# Patient Record
Sex: MALE | HISPANIC OR LATINO | URBAN - METROPOLITAN AREA
[De-identification: names, ages, dates, MRNs, and addresses within clinical notes are randomized per-mention and may not be internally consistent; named-entity substitution may affect disease eponyms.]

---

## 2023-03-17 ENCOUNTER — OFFICE VISIT (OUTPATIENT)
Dept: FAMILY MEDICINE | Facility: CLINIC | Age: 58
End: 2023-03-17
Payer: COMMERCIAL

## 2023-03-17 VITALS
TEMPERATURE: 97 F | OXYGEN SATURATION: 97 % | SYSTOLIC BLOOD PRESSURE: 120 MMHG | WEIGHT: 179 LBS | RESPIRATION RATE: 16 BRPM | HEIGHT: 67 IN | BODY MASS INDEX: 28.09 KG/M2 | HEART RATE: 70 BPM | DIASTOLIC BLOOD PRESSURE: 71 MMHG

## 2023-03-17 DIAGNOSIS — E11.9 TYPE 2 DIABETES MELLITUS WITHOUT COMPLICATION, WITH LONG-TERM CURRENT USE OF INSULIN (H): ICD-10-CM

## 2023-03-17 DIAGNOSIS — N52.9 ERECTILE DYSFUNCTION, UNSPECIFIED ERECTILE DYSFUNCTION TYPE: ICD-10-CM

## 2023-03-17 DIAGNOSIS — Z79.4 TYPE 2 DIABETES MELLITUS WITHOUT COMPLICATION, WITH LONG-TERM CURRENT USE OF INSULIN (H): ICD-10-CM

## 2023-03-17 DIAGNOSIS — E78.00 PURE HYPERCHOLESTEROLEMIA: ICD-10-CM

## 2023-03-17 DIAGNOSIS — Z00.00 ROUTINE HISTORY AND PHYSICAL EXAMINATION OF ADULT: Primary | ICD-10-CM

## 2023-03-17 DIAGNOSIS — Z12.11 SCREENING FOR COLON CANCER: ICD-10-CM

## 2023-03-17 PROBLEM — M72.2 PLANTAR FASCIITIS: Status: ACTIVE | Noted: 2021-07-27

## 2023-03-17 LAB
ANION GAP SERPL CALCULATED.3IONS-SCNC: 4 MMOL/L (ref 3–14)
BUN SERPL-MCNC: 20 MG/DL (ref 7–30)
CALCIUM SERPL-MCNC: 9.1 MG/DL (ref 8.5–10.1)
CHLORIDE BLD-SCNC: 103 MMOL/L (ref 94–109)
CHOLEST SERPL-MCNC: 108 MG/DL
CO2 SERPL-SCNC: 29 MMOL/L (ref 20–32)
CREAT SERPL-MCNC: 0.98 MG/DL (ref 0.66–1.25)
FASTING STATUS PATIENT QL REPORTED: YES
GFR SERPL CREATININE-BSD FRML MDRD: 89 ML/MIN/1.73M2
GLUCOSE BLD-MCNC: 134 MG/DL (ref 70–99)
HBA1C MFR BLD: 7.6 % (ref 0–5.6)
HDLC SERPL-MCNC: 37 MG/DL
LDLC SERPL CALC-MCNC: 54 MG/DL
NONHDLC SERPL-MCNC: 71 MG/DL
POTASSIUM BLD-SCNC: 4.2 MMOL/L (ref 3.4–5.3)
SODIUM SERPL-SCNC: 136 MMOL/L (ref 133–144)
TRIGL SERPL-MCNC: 85 MG/DL

## 2023-03-17 PROCEDURE — 99214 OFFICE O/P EST MOD 30 MIN: CPT | Mod: 25 | Performed by: PHYSICIAN ASSISTANT

## 2023-03-17 PROCEDURE — 99207 PR FOOT EXAM NO CHARGE: CPT | Mod: 25 | Performed by: PHYSICIAN ASSISTANT

## 2023-03-17 PROCEDURE — 36415 COLL VENOUS BLD VENIPUNCTURE: CPT | Performed by: PHYSICIAN ASSISTANT

## 2023-03-17 PROCEDURE — 99386 PREV VISIT NEW AGE 40-64: CPT | Performed by: PHYSICIAN ASSISTANT

## 2023-03-17 PROCEDURE — 80048 BASIC METABOLIC PNL TOTAL CA: CPT | Performed by: PHYSICIAN ASSISTANT

## 2023-03-17 PROCEDURE — 83036 HEMOGLOBIN GLYCOSYLATED A1C: CPT | Performed by: PHYSICIAN ASSISTANT

## 2023-03-17 PROCEDURE — 80061 LIPID PANEL: CPT | Performed by: PHYSICIAN ASSISTANT

## 2023-03-17 RX ORDER — INSULIN GLARGINE 100 [IU]/ML
INJECTION, SOLUTION SUBCUTANEOUS
COMMUNITY
Start: 2023-03-10 | End: 2023-03-30

## 2023-03-17 RX ORDER — CALCIUM CARBONATE 500 MG/1
1 TABLET, CHEWABLE ORAL 2 TIMES DAILY
COMMUNITY
End: 2023-03-30

## 2023-03-17 RX ORDER — ROSUVASTATIN CALCIUM 10 MG/1
TABLET, COATED ORAL
COMMUNITY
Start: 2023-03-10 | End: 2023-04-17

## 2023-03-17 RX ORDER — SILDENAFIL CITRATE 20 MG/1
20-60 TABLET ORAL
COMMUNITY
Start: 2022-03-03

## 2023-03-17 RX ORDER — BENZALKONIUM CHLORIDE 1.3 MG/ML
CLOTH TOPICAL
COMMUNITY

## 2023-03-17 RX ORDER — VIT C/B6/B5/MAGNESIUM/HERB 173 50-5-6-5MG
CAPSULE ORAL
COMMUNITY

## 2023-03-17 RX ORDER — GLIMEPIRIDE 4 MG/1
8 TABLET ORAL
COMMUNITY
Start: 2022-06-03 | End: 2023-04-17

## 2023-03-17 ASSESSMENT — PAIN SCALES - GENERAL: PAINLEVEL: NO PAIN (0)

## 2023-03-17 NOTE — PROGRESS NOTES
"  Assessment & Plan     Routine history and physical examination of adult  Completed    Type 2 diabetes mellitus without complication, with long-term current use of insulin (H)  Uncontrolled  - Lipid panel reflex to direct LDL Non-fasting; Future  - Basic metabolic panel  (Ca, Cl, CO2, Creat, Gluc, K, Na, BUN); Future  - Hemoglobin A1c; Future  - AMB Adult Diabetes Educator Referral; Future  - metFORMIN (GLUCOPHAGE) 1000 MG tablet; Take 1 tablet (1,000 mg) by mouth 2 times daily (with meals)  - Lipid panel reflex to direct LDL Non-fasting  - Basic metabolic panel  (Ca, Cl, CO2, Creat, Gluc, K, Na, BUN)  - Hemoglobin A1c  Getting labs redrawn. Discussed referral for education.  ? Numbers throughout the day and unclear of the numbers during day/after meals as does not check regularly. Cannot accurately dose out insulin until then.  Metformin refill  Screening for colon cancer  Ordered  - Colonoscopy Screening  Referral; Future    Pure hypercholesterolemia  Stable  Continue with medications    Erectile dysfunction, unspecified erectile dysfunction type  Stable  Continue medications as needed            BMI:   Estimated body mass index is 28.04 kg/m  as calculated from the following:    Height as of this encounter: 1.702 m (5' 7\").    Weight as of this encounter: 81.2 kg (179 lb).   Weight management plan: Discussed healthy diet and exercise guidelines        Return in about 3 months (around 6/17/2023) for Follow up, with me.    LONG MELCHOR PA-C  [facility]   Alejandro is a 57 year old, presenting for the following health issues:  Establish Care        History of Present Illness       Diabetes:   He presents for follow up of diabetes.  He is checking home blood glucose two times daily. He checks blood glucose before meals and at bedtime.  Blood glucose is never over 200 and never under 70. He is aware of hypoglycemia symptoms including shakiness. He has no concerns regarding " "his diabetes at this time.  He is not experiencing numbness or burning in feet, excessive thirst, blurry vision, weight changes or redness, sores or blisters on feet.         He eats 2-3 servings of fruits and vegetables daily.He consumes 0 sweetened beverage(s) daily.He exercises with enough effort to increase his heart rate 9 or less minutes per day.  He exercises with enough effort to increase his heart rate 3 or less days per week.   He is taking medications regularly.             Review of Systems   Constitutional, HEENT, cardiovascular, pulmonary, gi and gu systems are negative, except as otherwise noted.      Objective    /71   Pulse 70   Temp 97  F (36.1  C) (Tympanic)   Resp 16   Ht 1.702 m (5' 7\")   Wt 81.2 kg (179 lb)   SpO2 97%   BMI 28.04 kg/m    Body mass index is 28.04 kg/m .  Physical Exam   GENERAL: healthy, alert and no distress  HENT: ear canals and TM's normal, nose and mouth without ulcers or lesions  NECK: no adenopathy, no asymmetry, masses, or scars and thyroid normal to palpation  RESP: lungs clear to auscultation - no rales, rhonchi or wheezes  CV: regular rate and rhythm, normal S1 S2, no S3 or S4, no murmur, click or rub, no peripheral edema and peripheral pulses strong  ABDOMEN: soft, nontender, no hepatosplenomegaly, no masses and bowel sounds normal  MS: no gross musculoskeletal defects noted, no edema                    "

## 2023-03-20 ENCOUNTER — TELEPHONE (OUTPATIENT)
Dept: FAMILY MEDICINE | Facility: CLINIC | Age: 58
End: 2023-03-20
Payer: COMMERCIAL

## 2023-03-20 NOTE — TELEPHONE ENCOUNTER
Diabetes Education Scheduling Outreach #1:    Call to patient to schedule. Left message with phone number to call to schedule.    Plan for 2nd outreach attempt within 2 business days.    [facility]  Diabetes and Nutrition Scheduling

## 2023-03-22 NOTE — TELEPHONE ENCOUNTER
Diabetes Education Scheduling Outreach #2:    Call to patient to schedule. Left message with phone number to call to schedule.    Mckenzie Worthington  [place] [facility]  Diabetes and Nutrition Scheduling

## 2023-03-24 ENCOUNTER — TELEPHONE (OUTPATIENT)
Dept: GASTROENTEROLOGY | Facility: CLINIC | Age: 58
End: 2023-03-24

## 2023-03-24 ENCOUNTER — HOSPITAL ENCOUNTER (OUTPATIENT)
Facility: CLINIC | Age: 58
End: 2023-03-24
Attending: INTERNAL MEDICINE | Admitting: INTERNAL MEDICINE
Payer: COMMERCIAL

## 2023-03-24 ENCOUNTER — TELEPHONE (OUTPATIENT)
Dept: GASTROENTEROLOGY | Facility: CLINIC | Age: 58
End: 2023-03-24
Payer: COMMERCIAL

## 2023-03-24 DIAGNOSIS — Z12.11 ENCOUNTER FOR SCREENING COLONOSCOPY: Primary | ICD-10-CM

## 2023-03-24 RX ORDER — BISACODYL 5 MG/1
TABLET, DELAYED RELEASE ORAL
Qty: 4 TABLET | Refills: 0 | Status: ON HOLD | OUTPATIENT
Start: 2023-03-24 | End: 2024-01-15

## 2023-03-24 NOTE — TELEPHONE ENCOUNTER
Screening Questions  BLUE  KIND OF PREP RED  LOCATION [review exclusion criteria] GREEN  SEDATION TYPE        Y Are you active on mychart?       [facility] Ordering/Referring Provider?        [facility] What type of coverage do you have?      N Have you had a positive covid test in the last 14 days?     28.2 1. BMI  [BMI 40+ - review exclusion criteria]    Y  2. Are you able to give consent for your medical care? [IF NO,RN REVIEW]          N  3. Are you taking any prescription pain medications on a routine schedule   (ex narcotics: oxycodone, roxicodone, oxycontin,  and percocet)? [RN Review]          3a. EXTENDED PREP What kind of prescription?     N 4. Do you have any chemical dependencies such as alcohol, street drugs, or methadone?        **If yes 3- 5 , please schedule with MAC sedation.**          IF YES TO ANY 6 - 10 - HOSPITAL SETTING ONLY.     N 6.   Do you need assistance transferring?     N 7.   Have you had a heart or lung transplant?    N 8.   Are you currently on dialysis?   N 9.   Do you use daily home oxygen?   N 10. Do you take nitroglycerin?   10a.  If yes, how often?     11. [FEMALES]   Are you currently pregnant?    11a.  If yes, how many weeks? [ Greater than 12 weeks, OR NEEDED]    N 12. Do you have Pulmonary Hypertension? *NEED PAC APPT AT [facility] w/ MAC*     N 13. [review exclusion criteria]  Do you have any implantable devices in your body (pacemaker, defib, LVAD)?    N 14. In the past 6 months, have you had any heart related issues including cardiomyopathy or heart attack?     14a.  If yes, did it require cardiac stenting if so when?     N 15. Have you had a stroke or Transient ischemic attack (TIA - aka  mini stroke ) within 6 months?      Y - CPAP 16. Do you have mod to severe Obstructive Sleep Apnea?  [Hospital only]    N 17. Do you have SEVERE AND UNCONTROLLED asthma? *NEED PAC APPT AT [facility] w/MAC*     18. Are you currently taking any blood thinners?     18a. No. Continue to 19.   18b. Yes/no Blood  "Thinner: No [CONTINUE TO #19]    N 19. Do you take the medication Phentermine?    19a. If yes, \"Hold for 7 days before procedure.  Please consult your prescribing provider if you have questions about holding this medication.\"     N  20. Do you have chronic kidney disease?      Y  21. Do you have a diagnosis of diabetes?     N  22. On a regular basis do you go 3-5 days between bowel movements?      23. Preferred LOCAL Pharmacy for Pre Prescription    [ LIST ONLY ONE PHARMACY]     [place], [place] - [address], [address]    - CLOSING REMINDERS -    Informed patient they will need an adult    Cannot take any type of public or medical transportation alone    Conscious Sedation- Needs  for 6 hours after the procedure       MAC/General-Needs  for 24 hours after procedure    Pre-Procedure Covid test to be completed [[facility] PCR Testing Required]    Confirmed Nurse will call to complete assessment       - SCHEDULING DETAILS -  YES Hospital Setting Required? If yes, what is the exclusion?: KAY   EKNNETH  Surgeon    4/10  Date of Procedure  Lower Endoscopy [Colonoscopy]  Type of Procedure Scheduled  [facility]-If you answer yes to questions #8, #20, #21Which Colonoscopy Prep was Sent?     CS Sedation Type     N PAC / Pre-op Required                 "

## 2023-03-24 NOTE — TELEPHONE ENCOUNTER
Attempted to contact patient regarding upcoming Colonoscopy  procedure on 4/10/23 for pre assessment questions. No answer.     Left message to return call to [phone] #4    Discuss Covid policy and designated  policy.    Pre op exam? N/A    Arrival time: 0700. Procedure time: 0800    Facility location: [facility]; [address], [address], [place] [address] - severe KAY    Sedation type: Conscious sedation     Anticoagulants: No    Electronic implanted devices? No    Diabetic? Yes - Patient to hold oral diabetic medications day of procedure    Indication for procedure: screening colonoscopy    Bowel prep recommendation: Standard Golytely  d/t DM    Prep instructions sent via [employer]. Bowel prep script sent to    [facility] [address], [address]      Tahira Rose RN  Endoscopy Procedure Pre Assessment RN

## 2023-03-30 ENCOUNTER — NURSE TRIAGE (OUTPATIENT)
Dept: NURSING | Facility: CLINIC | Age: 58
End: 2023-03-30
Payer: COMMERCIAL

## 2023-03-30 DIAGNOSIS — U07.1 INFECTION DUE TO 2019 NOVEL CORONAVIRUS: Primary | ICD-10-CM

## 2023-03-30 NOTE — TELEPHONE ENCOUNTER
Alejandro calls and says that he tested Positive for Covid yesterday bella, at 2130. Pt. Says that he started feeling sick on Tuesday evening-with fever and chills. Pt. Says that his current symptoms are a headache, runny nose, sore throat, congestion, and a fever = 99-oral. Pt. Says that he would like to take Paxlovid. Declined paging the Dr.   COVID Positive/Requesting COVID treatment    Patient is positive for COVID and requesting treatment options.    Date of positive COVID test (PCR or at home)? Covid Positive on 3/29/2023 at 2130 with a home covid test  Current COVID symptoms: fever or chills, headache, sore throat and congestion or runny nose  Date COVID symptoms began: 3/28/2023    Message should be routed to clinic RN pool. Best phone number to use for call back: [phone]    Reason for Disposition    MILD difficulty breathing (e.g., minimal/no SOB at rest, SOB with walking, pulse <100)    Additional Information    Negative: SEVERE difficulty breathing (e.g., struggling for each breath, speaks in single words)    Negative: Difficult to awaken or acting confused (e.g., disoriented, slurred speech)    Negative: Bluish (or gray) lips or face now    Negative: Shock suspected (e.g., cold/pale/clammy skin, too weak to stand, low BP, rapid pulse)    Negative: Sounds like a life-threatening emergency to the triager    Negative: [1] Diagnosed or suspected COVID-19 AND [2] symptoms lasting 3 or more weeks    Negative: [1] COVID-19 exposure AND [2] no symptoms    Negative: COVID-19 vaccine reaction suspected (e.g., fever, headache, muscle aches) occurring 1 to 3 days after getting vaccine    Negative: COVID-19 vaccine, questions about    Negative: [1] Lives with someone known to have influenza (flu test positive) AND [2] flu-like symptoms (e.g., cough, runny nose, sore throat, SOB; with or without fever)    Negative: [1] Adult with possible COVID-19 symptoms AND [2] triager concerned about severity of symptoms or other  causes    Negative: COVID-19 and breastfeeding, questions about    Negative: SEVERE or constant chest pain or pressure  (Exception: Mild central chest pain, present only when coughing.)    Negative: MODERATE difficulty breathing (e.g., speaks in phrases, SOB even at rest, pulse 100-120)    Negative: Headache and stiff neck (can't touch chin to chest)    Negative: Oxygen level (e.g., pulse oximetry) 90 percent or lower    Negative: Chest pain or pressure  (Exception: MILD central chest pain, present only when coughing)    Negative: Patient sounds very sick or weak to the triager    Protocols used: CORONAVIRUS (COVID-19) DIAGNOSED OR [id number]

## 2023-03-30 NOTE — TELEPHONE ENCOUNTER
RN COVID TREATMENT VISIT  03/30/23    The patient has been triaged and does not require a higher level of care.    Alejandro Flores  57 year old  Current weight? 179#    Has the patient been seen by a primary care provider at an [facility] or [facility] within the past two years? Yes.   Have you been in close proximity to/do you have a known exposure to a person with a confirmed case of influenza? No.     General treatment eligibility:  Date of positive COVID test (PCR or at home)?  3/29/23, home test    Are you or have you been hospitalized for this COVID-19 infection? No.   Have you received monoclonal antibodies or antiviral treatment for COVID-19 since this positive test? No.   Do you have any of the following conditions that place you at risk of being very sick from COVID-19?   - Age 50 years or older  - Diabetes mellitus, type 1 and type 2  - Overweight or Obesity (BMI >85th percentile or BMI 25 or higher)  - A member of the [facility] community ( or [place] Native, , Black or ,  or )  Yes, patient has at least one high risk condition as noted above.     Current COVID symptoms:   - fever or chills  - cough  - fatigue  - muscle or body aches  - headache  - sore throat  - congestion or runny nose  Yes. Patient has at least one symptom as selected.     How many days since symptoms started? 5 days or less. Established patient, 12 years or older weighing at least 88.2 lbs, who has symptoms that started in the past 5 days, has not been hospitalized nor received treatment already, and is at risk for being very sick from COVID-19.     Treatment eligibility by RN:    Are you currently pregnant or nursing? No    Do you have a clinically significant hypersensitivity to nirmatrelvir or ritonavir, or toxic epidermal necrolysis (TEN) or Manley-Guy Syndrome? No    Do you have a history of hepatitis, any hepatic impairment on the Problem List  (such as Child-Leyva Class C, cirrhosis, fatty liver disease, alcoholic liver disease), or was the last liver lab (hepatic panel, ALT, AST, ALK Phos, bilirubin) elevated in the past 6 months? No    Do you have any history of severe renal impairment (eGFR < 30mL/min)? No    Is patient eligible to continue?   Yes, patient meets all eligibility requirements for the RN COVID treatment (as denoted by all no responses above).     Current Outpatient Medications   Medication Sig Dispense Refill     aspirin 81 MG tablet Take 1 tablet by mouth daily.       B Complex-C (VITAMIN B COMPLEX W/VITAMIN C) TABS tablet Take 1 tablet by mouth daily       bisacodyl (DULCOLAX) 5 MG EC tablet Take 2 tablets at 3 pm the day before your procedure. If your procedure is before 11 am, take 2 additional tablets at 11 pm. If your procedure is after 11 am, take 2 additional tablets at 6 am. For additional instructions refer to your colonoscopy prep instructions. 4 tablet 0     calcium carbonate (TUMS) 500 MG chewable tablet Take 1 chew tab by mouth 2 times daily       glimepiride (AMARYL) 4 MG tablet Take 8 mg by mouth       insulin glargine (LANTUS PEN) 100 UNIT/ML pen Inject 28 Units Subcutaneous       LANTUS SOLOSTAR 100 UNIT/ML soln        metFORMIN (GLUCOPHAGE) 1000 MG tablet Take 1 tablet (1,000 mg) by mouth 2 times daily (with meals) 180 tablet 0     Multiple Vitamins-Minerals (MULTI ADULT GUMMIES PO)        polyethylene glycol (GOLYTELY) 236 g suspension The night before the exam at 6 pm drink an 8-ounce glass every 15 minutes until the jug is half empty. If you arrive before 11 AM: Drink the other half of the Golytely jug at 11 PM night before procedure. If you arrive after 11 AM: Drink the other half of the Golytely jug at 6 AM day of procedure. For additional instructions refer to your colonoscopy prep instructions. 4000 mL 0     Respiratory Therapy Supplies (CARETOUCH CPAP & BIPAP HOSE) MISC        rosuvastatin (CRESTOR) 10 MG tablet         sildenafil (REVATIO) 20 MG tablet Take 20-60 mg by mouth       Turmeric 500 MG CAPS        Vitamin D3 (CHOLECALCIFEROL) 125 MCG (5000 UT) tablet Take by mouth daily         Medications from List 1 of the standing order (on medications that exclude the use of Paxlovid) that patient is taking: NONE. Is patient taking [place]'s Wort? No  Is patient taking Robel's Wort or any meds from List 1? No.   Medications from List 2 of the standing order (on meds that provider needs to adjust) that patient is taking: NONE. Is patient on any of the meds from List 2? No.   Medications from List 3 of standing order (on meds that a RN needs to adjust) that patient is taking: rosuvastatin (Crestor): Instructed patient to stop rosuvastatin while taking Paxlovid and restart rosuvastatin 1 day after the completion of Paxlovid.   sildenafil (Viagra, Revatio): Is patient using for erectile dysfunction? Yes, instructed patient to stop taking sildenafil while taking Paxlovid and restart sildenafil 3 days after the completion of Paxlovid. Is patient on any meds from List 3? Yes. Patient is on meds from list 3. No meds require a provider visit and at least one med required RN to adjust.     Paxlovid has an approximate 90% reduction in hospitalization. Paxlovid can possibly cause altered sense of taste, diarrhea (loose, watery stools), high blood pressure, muscle aches.     Would patient like a Paxlovid prescription?   Yes.   Lab Results   Component Value Date    GFRESTIMATED 89 03/17/2023       Was last eGFR reduced? No, eGFR 60 or greater/ No Result on record. Patient can receive the normal renal function dose. Paxlovid Rx sent to [facility]   [facility] [phone]  [address], [address]  [place] [address]    Hours:  Mon-Fri: 7:00a - 7:00p    Drive-thru services available.    Temporary change to home medications: yes, instructions given    All medication adjustments (holds, etc) were discussed with the  patient and patient was asked to repeat back (teachback) their med adjustment.  Did patient understand med adjustment? Yes, patient repeated back and understood correctly.        Reviewed the following instructions with the patient:    Paxlovid (nimatrelvir and ritonavir)    How it works  Two medicines (nirmatrelvir and ritonavir) are taken together. They stop the virus from growing. Less amount of virus is easier for your body to fight.    How to take    Medicine comes in a daily container with both medicine tablets. Take by mouth twice daily (once in the morning, once at night) for 5 days.    The number of tablets to take varies by patient.    Don't chew or break capsules. Swallow whole.    When to take  Take as soon as possible after positive COVID-19 test result, and within 5 days of your first symptoms.    Possible side effects  Can cause altered sense of taste, diarrhea (loose, watery stools), high blood pressure, muscle aches.      Bernarda Williamson RN  Clinical Triage/Primary Care  [facility]

## 2023-04-03 ENCOUNTER — TELEPHONE (OUTPATIENT)
Dept: GASTROENTEROLOGY | Facility: CLINIC | Age: 58
End: 2023-04-03
Payer: COMMERCIAL

## 2023-04-03 NOTE — TELEPHONE ENCOUNTER
Patient left voicemail asking for thyroid test to be ordered? Second attempt for pre-assessment prior to upcoming colonoscopy on 4.10.23      No answer.  Left message to return call [phone] #4    MyChart message sent    Magnolia Begum RN  Endoscopy Procedure Pre Assessment RN

## 2023-04-03 NOTE — TELEPHONE ENCOUNTER
Caller: Alejandro  Reason for Reschedule/Cancellation (please be detailed, any staff messages or encounters to note?): Covid positive 3/30/23      Prior to reschedule please review:    Ordering Provider:Chelsey    Sedation per order:CS    Does patient have any ASC Exclusions, please identify?: y--KAY      Notes on Cancelled Procedure:    Procedure:Lower Endoscopy [Colonoscopy]     Date: 4/10    Location:[facility]; [address] [place] [address]    Surgeon: Patricia        Rescheduled: Yes    Procedure: Lower Endoscopy [Colonoscopy]     Date: 5/11    Location:[facility]; [address] [place] [address]    Surgeon: EDISON Jarvis    Sedation Level Scheduled  CS,  Reason for Sedation Level order    Prep/Instructions updated and sent:

## 2023-04-14 DIAGNOSIS — Z12.11 SCREENING FOR COLON CANCER: Primary | ICD-10-CM

## 2023-04-17 ENCOUNTER — MYC REFILL (OUTPATIENT)
Dept: FAMILY MEDICINE | Facility: CLINIC | Age: 58
End: 2023-04-17
Payer: COMMERCIAL

## 2023-04-17 DIAGNOSIS — Z79.4 TYPE 2 DIABETES MELLITUS WITHOUT COMPLICATION, WITH LONG-TERM CURRENT USE OF INSULIN (H): Primary | ICD-10-CM

## 2023-04-17 DIAGNOSIS — E11.9 TYPE 2 DIABETES MELLITUS WITHOUT COMPLICATION, WITH LONG-TERM CURRENT USE OF INSULIN (H): Primary | ICD-10-CM

## 2023-04-17 DIAGNOSIS — E78.00 PURE HYPERCHOLESTEROLEMIA: ICD-10-CM

## 2023-04-17 RX ORDER — ROSUVASTATIN CALCIUM 10 MG/1
10 TABLET, COATED ORAL DAILY
Qty: 90 TABLET | Refills: 0 | Status: SHIPPED | OUTPATIENT
Start: 2023-04-17 | End: 2023-05-09

## 2023-04-17 RX ORDER — GLIMEPIRIDE 4 MG/1
8 TABLET ORAL
Qty: 180 TABLET | Refills: 0 | Status: SHIPPED | OUTPATIENT
Start: 2023-04-17 | End: 2023-08-03

## 2023-04-25 ENCOUNTER — TELEPHONE (OUTPATIENT)
Dept: GASTROENTEROLOGY | Facility: CLINIC | Age: 58
End: 2023-04-25
Payer: COMMERCIAL

## 2023-04-25 ENCOUNTER — HOSPITAL ENCOUNTER (OUTPATIENT)
Facility: AMBULATORY SURGERY CENTER | Age: 58
End: 2023-04-25
Attending: SPECIALIST | Admitting: SPECIALIST
Payer: COMMERCIAL

## 2023-04-25 NOTE — TELEPHONE ENCOUNTER
"Caller:   Reason for Reschedule/Cancellation (please be detailed, any staff messages or encounters to note?): 1ST STAFF MESSAGE   Brianna Huerta RN  P Endoscopy Scheduling Pool  Scheduling,     Please let nursing know when this patient is r/s to  ASC w/ MAC.     Thank you,   Shad      ----- Message -----   From: Ilir Patel MD   Sent: 4/24/2023   4:46 PM CDT   To: Carmen Rahman RN; Loc White; *   Subject: RE: SCHEDULED HOSPITAL - SHOULD BE ASC           Approved for MAC at [facility], cannot be done under CS.     Ulises Patel   ----- Message -----   From: Brianna Huerta RN   Sent: 4/24/2023  12:34 PM CDT   To: Carmen Rahman RN; Loc White;  Anes Review; *   Subject: RE: SCHEDULED HOSPITAL - SHOULD BE ASC           Please review and advise to determine if patient can proceed with scheduled procedure or if a hospital setting is recommended.     Patient has an order to schedule [place] [facility]   Indication: screening colonoscopy     Reason for review: severe KAY.     Per 4.14.2023 Psychiatrict encounter \"I got a message saying that the colonoscopy is NOT covered at an outpatient hospital as it is currently scheduled for you. I resent the request and chose [facility] as they said they would cover an ambulatory surgery center instead of a hospital.\"     Can patient be scheduled at [facility]?       Thank you,   Brianna Huerta RN   Endoscopy Procedure Pre Assessment RN   [phone] option 4     ----- Message -----   From: Loc White   Sent: 4/24/2023  11:13 AM CDT   To: Carmen Rahman RN; *   Subject: RE: SCHEDULED HOSPITAL - SHOULD BE ASC           Hel,       Patient is scheduled at [facility] due to KAY. I am FWD to our RN to review if we can get approval from anesthesiologist to schedule patient at [facility].       Endoscopy RN please see if this patient can be seen at [facility].           Thank you,     Loc White   [facility] Endoscopy   Procedure Scheduling    [phone] option 2 " "[procedures]           ----- Message -----   From: Carmen Rahman, CLEMENTINE   Sent: 4/24/2023  10:49 AM CDT   To: Endoscopy Scheduling Pool   Subject: FW: SCHEDULED HOSPITAL - SHOULD BE ASC           Hello there, this patient needs to be scheduled in the ASC and not the hospital per note from provider below. Can you please assist?   Thanks,   Carmen Rahman, CLEMENTINE   ----- Message -----   From: Sage Dyer   Sent: 4/24/2023   8:42 AM CDT   To: [facility]   Subject: FW: SCHEDULED HOSPITAL - SHOULD BE ASC             ----- Message -----   From: Tyler Barbosa PA-C   Sent: 4/24/2023   8:36 AM CDT   To: An  Primary Care   Subject: FW: SCHEDULED HOSPITAL - SHOULD BE ASC           Can you guys help with this?     Tyler   ----- Message -----   From: Francheska Munguia   Sent: 4/24/2023   8:32 AM CDT   To: Tyler Barbosa PA-C   Subject: SCHEDULED HOSPITAL - SHOULD BE ASC               Per previous note from TANJA Agarwal, this will be rescheduled at an Ambulatory Surgery Center.  This is still scheduled at [facility].  Please have your  reschedule this at an ASC.  Thank you         2ND STAFF MESSAGE  Ilir Patel MD  P Endoscopy Pre Assessment Nurse Pool; Loc White; Carmen Rahman, CLEMENTINE; P Endoscopy Scheduling Pool  Approved for [facility] [place], cannot be done under CS.     Ulises Patel      ----- Message -----   From: Brianna Huerta RN   Sent: 4/24/2023  12:34 PM CDT   To: Carmen Rahman, CLEMENTINE; Loc White; Mg Kelly Review; *   Subject: RE: SCHEDULED HOSPITAL - SHOULD BE ASC           Please review and advise to determine if patient can proceed with scheduled procedure or if a hospital setting is recommended.     Patient has an order to schedule Colonoscopy at [facility]   Indication: screening colonoscopy     Reason for review: severe KAY.     Per 4.14.2023 MyChart encounter \"I got a message saying that the colonoscopy is NOT covered at an outpatient " "hospital as it is currently scheduled for you. I resent the request and chose [facility] as they said they would cover an ambulatory surgery center instead of a hospital.\"     Can patient be scheduled at [facility]?       Thank you,   Brianna Huetra RN   Endoscopy Procedure Pre Assessment RN   [phone] option 4     ----- Message -----   From: Loc White   Sent: 4/24/2023  11:13 AM CDT   To: Carmen Rahman RN; *   Subject: RE: SCHEDULED HOSPITAL - SHOULD BE ASC           Hello,       Patient is scheduled at [facility] due to KAY. I am FWD to our RN to review if we can get approval from anesthesiologist to schedule patient at [facility].       Endoscopy RN please see if this patient can be seen at [facility].           Thank you,     Loc [facility] Endoscopy   Procedure Scheduling    [phone] option 2 [procedures]           ----- Message -----   From: Carmen Rahman RN   Sent: 4/24/2023  10:49 AM CDT   To: Endoscopy Scheduling Pool   Subject: FW: SCHEDULED HOSPITAL - SHOULD BE ASC           Hello there, this patient needs to be scheduled in the ASC and not the hospital per note from provider below. Can you please assist?   Thanks,   Carmen Rahman RN   ----- Message -----   From: Sage Dyer   Sent: 4/24/2023   8:42 AM CDT   To: [facility]   Subject: FW: SCHEDULED HOSPITAL - SHOULD BE ASC             ----- Message -----   From: Tyler Barbosa PA-C   Sent: 4/24/2023   8:36 AM CDT   To: An  Primary Care   Subject: FW: SCHEDULED HOSPITAL - SHOULD BE ASC           Can you guys help with this?     Tyler   ----- Message -----   From: Francheska Munguia   Sent: 4/24/2023   8:32 AM CDT   To: Tyler Barbosa PA-C   Subject: SCHEDULED HOSPITAL - SHOULD BE ASC               Per previous note from TANJA Agarwal, this will be rescheduled at an Ambulatory Surgery Center.  This is still scheduled at [facility].  Please have your  reschedule " this at an ASC.  Thank you                                 Prior to reschedule please review:    Ordering ProvidER LONG MELCHOR    Sedation per order:CS    Does patient have any ASC Exclusions, please identify?:       Notes on Cancelled Procedure:    Procedure:Lower Endoscopy [Colonoscopy]     Date: 5/11    Location:[facility]; [address] , [address], [place] [address]    Surgeon: TJ        Rescheduled: Yes    Procedure: Lower Endoscopy [Colonoscopy]     Date: 7/18    Location:[facility]; [address] [place], [place] [address]e [address], [address] Floor, [place] [address]    Surgeon: AMY    Sedation Level Scheduled  MAC,  Reason for Sedation Level SEE ABOVE    Prep/Instructions updated and sent: N    PATIENT WAS OK WITH THIS TIME. THIS WAS THE EARLIEST TIME OPEN

## 2023-05-05 ENCOUNTER — TELEPHONE (OUTPATIENT)
Dept: FAMILY MEDICINE | Facility: CLINIC | Age: 58
End: 2023-05-05
Payer: COMMERCIAL

## 2023-05-05 NOTE — TELEPHONE ENCOUNTER
[facility] mail order pharmacy calling regarding a refill request for patient's rosuvastatin. They are asking for a verbal ok to fill it through them.     This RN sees that this medication was refilled 4/17/23 after patient sent a MyChart refill request asking for it to be filled to a local pharmacy. Informed [facility] there is no refill for them.     Griselda Garces BSN, RN

## 2023-05-09 ENCOUNTER — MYC REFILL (OUTPATIENT)
Dept: FAMILY MEDICINE | Facility: CLINIC | Age: 58
End: 2023-05-09
Payer: COMMERCIAL

## 2023-05-09 DIAGNOSIS — E78.00 PURE HYPERCHOLESTEROLEMIA: ICD-10-CM

## 2023-05-09 RX ORDER — ROSUVASTATIN CALCIUM 10 MG/1
10 TABLET, COATED ORAL DAILY
Qty: 90 TABLET | Refills: 0 | Status: SHIPPED | OUTPATIENT
Start: 2023-05-09 | End: 2023-09-27

## 2023-06-02 ENCOUNTER — TELEPHONE (OUTPATIENT)
Dept: GASTROENTEROLOGY | Facility: CLINIC | Age: 58
End: 2023-06-02
Payer: COMMERCIAL

## 2023-06-02 NOTE — TELEPHONE ENCOUNTER
Caller:   Reason for Reschedule/Cancellation (please be detailed, any staff messages or encounters to note?): DOCTOR OUT      Prior to reschedule please review:    Ordering Provider:LONG MELCHOR    Sedation per order:CS    Does patient have any ASC Exclusions, please identify?:       Notes on Cancelled Procedure:    Procedure:Lower Endoscopy [Colonoscopy]     Date: 7/18    Location:[facility]; [address], [place] [address]    Surgeon: AMY        Rescheduled: No  RESCHEDULE ATTEMPT LVM CASE IN DEPOT

## 2023-06-05 NOTE — TELEPHONE ENCOUNTER
PT WAS RESCHEDULED WITH [facility] BUT THEN AFTER LOOKING AT IN BASKET MESSAGES IT HAS TO BE UNDER MAC. RIGHT NOW NO MAC SLOTS AVAILABLE AT  SO LET PT KNOW AND WE WILL DEFER TO CALL HIM IN A MONTH PER THE REQUEST     Rescheduled: Yes    Procedure: Lower Endoscopy [Colonoscopy]     Date: 08/01/2023    Location:[facility]; [address], [place] [address]    Surgeon: Winston    Sedation Level Scheduled  moderate,  Reason for Sedation Level per order    Prep/Instructions updated and sent: jamila

## 2023-07-03 ENCOUNTER — HOSPITAL ENCOUNTER (OUTPATIENT)
Facility: CLINIC | Age: 58
End: 2023-07-03
Attending: INTERNAL MEDICINE | Admitting: INTERNAL MEDICINE
Payer: COMMERCIAL

## 2023-07-03 NOTE — TELEPHONE ENCOUNTER
Caller:   Reason for Reschedule/Cancellation (please be detailed, any staff messages or encounters to note?): RESCHEDULE      Prior to reschedule please review:    Ordering Provider: LONG MELCHOR    Sedation per order: CS    Does patient have any ASC Exclusions, please identify?: KAY    PRE OP Y      Notes on Cancelled Procedure:    Procedure: Lower Endoscopy [Colonoscopy]     Date: 8/8    Location: [facility]; [address] [place] [address]    Surgeon: PRINCESS      Rescheduled: Yes    Procedure: Lower Endoscopy [Colonoscopy]     Date: 10/4    Location: [facility]; [address] [place] [address]    Surgeon: CHUNG    Sedation Level Scheduled  MAC,  Reason for Sedation Level PER IN BASKET MESSAGE    Prep/Instructions updated and sent: Y     Send In - basket message to Panc - Andrey Pool if EUS  procedure is canceled or rescheduled: [ N/A, YES or NO]

## 2023-07-15 ENCOUNTER — HEALTH MAINTENANCE LETTER (OUTPATIENT)
Age: 58
End: 2023-07-15

## 2023-08-03 DIAGNOSIS — E11.9 TYPE 2 DIABETES MELLITUS WITHOUT COMPLICATION, WITH LONG-TERM CURRENT USE OF INSULIN (H): ICD-10-CM

## 2023-08-03 DIAGNOSIS — Z79.4 TYPE 2 DIABETES MELLITUS WITHOUT COMPLICATION, WITH LONG-TERM CURRENT USE OF INSULIN (H): ICD-10-CM

## 2023-08-03 RX ORDER — GLIMEPIRIDE 4 MG/1
8 TABLET ORAL
Qty: 180 TABLET | Refills: 0 | Status: SHIPPED | OUTPATIENT
Start: 2023-08-03 | End: 2023-10-09

## 2023-09-05 DIAGNOSIS — Z79.4 TYPE 2 DIABETES MELLITUS WITHOUT COMPLICATION, WITH LONG-TERM CURRENT USE OF INSULIN (H): ICD-10-CM

## 2023-09-05 DIAGNOSIS — E11.9 TYPE 2 DIABETES MELLITUS WITHOUT COMPLICATION, WITH LONG-TERM CURRENT USE OF INSULIN (H): ICD-10-CM

## 2023-09-18 RX ORDER — BISACODYL 5 MG/1
TABLET, DELAYED RELEASE ORAL
Qty: 4 TABLET | Refills: 0 | Status: ON HOLD | OUTPATIENT
Start: 2023-09-18 | End: 2024-01-15

## 2023-09-18 NOTE — TELEPHONE ENCOUNTER
Rescheduled colonoscopy    Pre visit planning completed.      Procedure details:    Patient scheduled for Colonoscopy  on 10.4.2023.     Arrival time: 0715. Procedure time 0815    Pre op exam needed? Yes.  Patient needs to complete a pre-operative exam prior to procedure.  Informed patient pre-op is needed via [employer] message    Facility location: [facility]; [facility] [address] [place] [address]    Sedation type: MAC    Indication for procedure: screening colonoscopy      Chart review:     Electronic implanted devices? No    Diabetic? Yes. See medication holding recommendations     Diabetic medication HOLDING recommendations: (if applicable)  Oral diabetic medications: No  Diabetic injectables: No  Insulin: Yes. Consult with managing provider       Medication review:    Anticoagulants? No    NSAIDS? No NSAID medications per patient's medication list.  RN will verify with pre-assessment call.    Other medication HOLDING recommendations:  N/A      Prep for procedure:     Bowel prep recommendation: Standard Golytely    Due to:  standard bowel prep.    Prep instructions sent via [employer] Bowel prep script sent to    [facility] [address], [address]          Brianna Huerta RN  Endoscopy Procedure Pre Assessment RN  [phone] option 4

## 2023-09-19 NOTE — TELEPHONE ENCOUNTER
Pre assessment completed for upcoming procedure.   (Please see previous telephone encounter notes for complete details)      Procedure details:    Arrival time and facility location reviewed.    Pre op exam needed? Yes. Patient has a follow up scheduled with  PCP on 9/27/23. Instructed patient to confirm that this appointment will be a pre-op physical. Patient stated he will call clinic to make sure pre-op physical can be completed on that date.    Designated  policy reviewed. Instructed to have someone stay 24 hours post procedure.     COVID policy reviewed.      Medication review:    Medications reviewed. Please see supporting documentation below. Holding recommendations discussed (if applicable).   Oral diabetic medication(s): Glimepiride (amaryl): HOLD day of procedure.  Metformin (glucophage): HOLD day of procedure.  Insulin.  Consult with your managing provider.   NSAID medication(s): Ibuprofen (Advil, Motrin): HOLD 1 day before procedure.  Naproxen (Aleve, Naprosyn): HOLD 4 days before procedure.   ASA 81 mg - no indication to hold    Prep for procedure:     Procedure prep instructions reviewed.        Additional information needed?  N/A      Patient  verbalized understanding and had no questions or concerns at this time.      Meghana Cristobal RN  Endoscopy Procedure Pre Assessment RN  [phone] option 4

## 2023-09-25 NOTE — PROGRESS NOTES
{PROVIDER CHARTING PREFERENCE:116754}    Rick Allen is a 58 year old, presenting for the following health issues:  Diabetes  {(!) Visit Details have not yet been documented.  Please enter Visit Details and then use this list to pull in documentation. (Optional):572542}    HPI     {MA/LPN/RN Pre-Provider Visit Orders- hCG/UA/Strep (Optional):414963}  {SUPERLIST (Optional):414261}  {additonal problems for provider to add (Optional):979671}      Review of Systems   {ROS COMP (Optional):016452}      Objective    There were no vitals taken for this visit.  There is no height or weight on file to calculate BMI.  Physical Exam   {Exam List (Optional):068940}    {Diagnostic Test Results (Optional):359530}    {AMBULATORY ATTESTATION (Optional):837323}

## 2023-09-27 ENCOUNTER — OFFICE VISIT (OUTPATIENT)
Dept: FAMILY MEDICINE | Facility: CLINIC | Age: 58
End: 2023-09-27
Payer: COMMERCIAL

## 2023-09-27 VITALS
RESPIRATION RATE: 16 BRPM | OXYGEN SATURATION: 98 % | SYSTOLIC BLOOD PRESSURE: 136 MMHG | DIASTOLIC BLOOD PRESSURE: 59 MMHG | HEART RATE: 75 BPM | TEMPERATURE: 97.7 F | BODY MASS INDEX: 28.51 KG/M2 | WEIGHT: 182 LBS

## 2023-09-27 DIAGNOSIS — E11.9 TYPE 2 DIABETES MELLITUS WITHOUT COMPLICATION, WITH LONG-TERM CURRENT USE OF INSULIN (H): ICD-10-CM

## 2023-09-27 DIAGNOSIS — G47.33 OSA (OBSTRUCTIVE SLEEP APNEA): ICD-10-CM

## 2023-09-27 DIAGNOSIS — Z79.4 TYPE 2 DIABETES MELLITUS WITHOUT COMPLICATION, WITH LONG-TERM CURRENT USE OF INSULIN (H): ICD-10-CM

## 2023-09-27 DIAGNOSIS — E78.00 PURE HYPERCHOLESTEROLEMIA: ICD-10-CM

## 2023-09-27 DIAGNOSIS — Z01.818 PREOP GENERAL PHYSICAL EXAM: Primary | ICD-10-CM

## 2023-09-27 DIAGNOSIS — Z12.11 SCREEN FOR COLON CANCER: ICD-10-CM

## 2023-09-27 PROBLEM — Z71.89 ADVANCED DIRECTIVES, COUNSELING/DISCUSSION: Status: ACTIVE | Noted: 2023-09-27

## 2023-09-27 LAB
ANION GAP SERPL CALCULATED.3IONS-SCNC: 12 MMOL/L (ref 7–15)
BUN SERPL-MCNC: 13.6 MG/DL (ref 8–23)
CALCIUM SERPL-MCNC: 9.5 MG/DL (ref 8.8–10.2)
CHLORIDE SERPL-SCNC: 100 MMOL/L (ref 98–107)
CREAT SERPL-MCNC: 0.84 MG/DL (ref 0.67–1.17)
CREAT UR-MCNC: 179 MG/DL
DEPRECATED HCO3 PLAS-SCNC: 25 MMOL/L (ref 22–29)
EGFRCR SERPLBLD CKD-EPI 2021: >90 ML/MIN/1.73M2
GLUCOSE SERPL-MCNC: 184 MG/DL (ref 70–99)
HBA1C MFR BLD: 10.8 % (ref 0–5.6)
MICROALBUMIN UR-MCNC: <12 MG/L
MICROALBUMIN/CREAT UR: NORMAL MG/G{CREAT}
POTASSIUM SERPL-SCNC: 4.3 MMOL/L (ref 3.4–5.3)
SODIUM SERPL-SCNC: 137 MMOL/L (ref 135–145)

## 2023-09-27 PROCEDURE — 99214 OFFICE O/P EST MOD 30 MIN: CPT | Performed by: PHYSICIAN ASSISTANT

## 2023-09-27 PROCEDURE — 82570 ASSAY OF URINE CREATININE: CPT | Performed by: PHYSICIAN ASSISTANT

## 2023-09-27 PROCEDURE — 93000 ELECTROCARDIOGRAM COMPLETE: CPT | Performed by: PHYSICIAN ASSISTANT

## 2023-09-27 PROCEDURE — 80048 BASIC METABOLIC PNL TOTAL CA: CPT | Performed by: PHYSICIAN ASSISTANT

## 2023-09-27 PROCEDURE — 82043 UR ALBUMIN QUANTITATIVE: CPT | Performed by: PHYSICIAN ASSISTANT

## 2023-09-27 PROCEDURE — 36415 COLL VENOUS BLD VENIPUNCTURE: CPT | Performed by: PHYSICIAN ASSISTANT

## 2023-09-27 PROCEDURE — 83036 HEMOGLOBIN GLYCOSYLATED A1C: CPT | Performed by: PHYSICIAN ASSISTANT

## 2023-09-27 RX ORDER — ROSUVASTATIN CALCIUM 10 MG/1
10 TABLET, COATED ORAL DAILY
Qty: 90 TABLET | Refills: 3 | Status: SHIPPED | OUTPATIENT
Start: 2023-09-27

## 2023-09-27 ASSESSMENT — PAIN SCALES - GENERAL: PAINLEVEL: NO PAIN (0)

## 2023-09-27 NOTE — PROGRESS NOTES
[facility]  [address] [facility] [address]  Phone: [phone]  Primary Provider: Long Melchor  Pre-op Performing Provider: LONG MELCHOR      PREOPERATIVE EVALUATION:  Today's date: 9/27/2023    Alejandro is a 58 year old male who presents for a preoperative evaluation.      9/27/2023     7:37 AM   Additional Questions   Roomed by Harper Saleem MA   Accompanied by Self       Surgical Information:  Surgery/Procedure: Colonoscopy procedure  Surgery Location: [facility]  Surgeon: Dr. Barry Osei  Surgery Date: 10/04/2023  Time of Surgery: TBD  Where patient plans to recover: At home with family  Fax number for surgical facility: Note does not need to be faxed, will be available electronically in Epic.    Assessment & Plan     The proposed surgical procedure is considered MODERATE risk.    Preop general physical exam  Colonoscopy 10/4  - EKG 12-lead complete w/read - Clinics  No concerns, questions    Screen for colon cancer  Having down through [facility]     Type 2 diabetes mellitus without complication, with long-term current use of insulin (H)  Unstable  - Albumin Random Urine Quantitative with Creat Ratio; Future  - HEMOGLOBIN A1C; Future  - Basic metabolic panel  (Ca, Cl, CO2, Creat, Gluc, K, Na, BUN); Future  - Albumin Random Urine Quantitative with Creat Ratio  - HEMOGLOBIN A1C  - Basic metabolic panel  (Ca, Cl, CO2, Creat, Gluc, K, Na, BUN)  metformin,glimepiride, lantus at PM.  Addition of trulicity, send to diabetic education for pre-op glucose management for clearance          Possible Sleep Apnea: Has sleep apnea with regular cpap usage        - No identified additional risk factors other than previously addressed    Antiplatelet or Anticoagulation Medication Instructions:   - aspirin: Bleeding risk is low for this procedure and daily aspirin may be continued without modification.     Additional Medication Instructions:  To take 80% of lantus dose the  night before surgery. Remaining medications can be taken without alteration    RECOMMENDATION:  APPROVAL NOT GIVEN to proceed with proposed procedure until blood glucose is under control with blood glucose readings below 180 consistently             Subjective       HPI related to upcoming procedure:           9/27/2023     7:49 AM   Preop Questions   1. Have you ever had a heart attack or stroke? No   2. Have you ever had surgery on your heart or blood vessels, such as a stent placement, a coronary artery bypass, or surgery on an artery in your head, neck, heart, or legs? No   3. Do you have chest pain with activity? No   4. Do you have a history of  heart failure? No   5. Do you currently have a cold, bronchitis or symptoms of other infection? No   6. Do you have a cough, shortness of breath, or wheezing? No   7. Do you or anyone in your family have previous history of blood clots? YES - mom with DVT, no clotting disorder   8. Do you or does anyone in your family have a serious bleeding problem such as prolonged bleeding following surgeries or cuts? No   9. Have you ever had problems with anemia or been told to take iron pills? No   10. Have you had any abnormal blood loss such as black, tarry or bloody stools? No   11. Have you ever had a blood transfusion? No   12. Are you willing to have a blood transfusion if it is medically needed before, during, or after your surgery? Yes   13. Have you or any of your relatives ever had problems with anesthesia? No   14. Do you have sleep apnea, excessive snoring or daytime drowsiness? YES - with cpap   14a. Do you have a CPAP machine? Yes   15. Do you have any artifical heart valves or other implanted medical devices like a pacemaker, defibrillator, or continuous glucose monitor? No   16. Do you have artificial joints? No   17. Are you allergic to latex? No     Health Care Directive:  Patient does not have a Health Care Directive or Living Will: Discussed advance care  planning with patient; however, patient declined at this time.    Preoperative Review of :   reviewed - no record of controlled substances prescribed.          Review of Systems  CONSTITUTIONAL: NEGATIVE for fever, chills, change in weight  INTEGUMENTARY/SKIN: NEGATIVE for worrisome rashes, moles or lesions  EYES: NEGATIVE for vision changes or irritation  ENT/MOUTH: NEGATIVE for ear, mouth and throat problems  RESP: NEGATIVE for significant cough or SOB  CV: NEGATIVE for chest pain, palpitations or peripheral edema  GI: NEGATIVE for nausea, abdominal pain, heartburn, or change in bowel habits  : NEGATIVE for frequency, dysuria, or hematuria  MUSCULOSKELETAL: NEGATIVE for significant arthralgias or myalgia  NEURO: NEGATIVE for weakness, dizziness or paresthesias  ENDOCRINE: NEGATIVE for temperature intolerance, skin/hair changes  HEME: NEGATIVE for bleeding problems  PSYCHIATRIC: NEGATIVE for changes in mood or affect    Patient Active Problem List    Diagnosis Date Noted    Advanced directives, counseling/discussion 09/27/2023     Priority: Medium     Pt was given info on ADV. Harper Saleem MA      Erectile dysfunction 03/10/2022     Priority: Medium    Plantar fasciitis 07/27/2021     Priority: Medium    Pure hypercholesterolemia 04/23/2018     Priority: Medium      No past medical history on file.  No past surgical history on file.  Current Outpatient Medications   Medication Sig Dispense Refill    aspirin 81 MG tablet Take 1 tablet by mouth daily.      B Complex-C (VITAMIN B COMPLEX W/VITAMIN C) TABS tablet Take 1 tablet by mouth daily      bisacodyl (DULCOLAX) 5 MG EC tablet Take 2 tablets at 3 pm the day before your procedure. If your procedure is before 11 am, take 2 additional tablets at 11 pm. If your procedure is after 11 am, take 2 additional tablets at 6 am. For additional instructions refer to your colonoscopy prep instructions. 4 tablet 0    bisacodyl (DULCOLAX) 5 MG EC tablet Take 2 tablets at 3  pm the day before your procedure. If your procedure is before 11 am, take 2 additional tablets at 11 pm. If your procedure is after 11 am, take 2 additional tablets at 6 am. For additional instructions refer to your colonoscopy prep instructions. 4 tablet 0    glimepiride (AMARYL) 4 MG tablet Take 2 tablets (8 mg) by mouth every morning (before breakfast) 180 tablet 0    insulin glargine (LANTUS PEN) 100 UNIT/ML pen Inject 28 Units Subcutaneous At Bedtime 15 mL 0    metFORMIN (GLUCOPHAGE) 1000 MG tablet Take 1 tablet (1,000 mg) by mouth 2 times daily (with meals) 180 tablet 0    Multiple Vitamins-Minerals (MULTI ADULT GUMMIES PO)       polyethylene glycol (GOLYTELY) 236 g suspension The night before the exam at 6 pm drink an 8-ounce glass every 15 minutes until the jug is half empty. If you arrive before 11 AM: Drink the other half of the Golytely jug at 11 PM night before procedure. If you arrive after 11 AM: Drink the other half of the Golytely jug at 6 AM day of procedure. For additional instructions refer to your colonoscopy prep instructions. 4000 mL 0    polyethylene glycol (GOLYTELY) 236 g suspension The night before the exam at 6 pm drink an 8-ounce glass every 15 minutes until the jug is half empty. If you arrive before 11 AM: Drink the other half of the Golytely jug at 11 PM night before procedure. If you arrive after 11 AM: Drink the other half of the Golytely jug at 6 AM day of procedure. For additional instructions refer to your colonoscopy prep instructions. 4000 mL 0    Respiratory Therapy Supplies (CARETOUCH CPAP & BIPAP HOSE) MISC       rosuvastatin (CRESTOR) 10 MG tablet Take 1 tablet (10 mg) by mouth daily 90 tablet 0    sildenafil (REVATIO) 20 MG tablet Take 20-60 mg by mouth      Turmeric 500 MG CAPS       Vitamin D3 (CHOLECALCIFEROL) 125 MCG (5000 UT) tablet Take by mouth daily         No Known Allergies     Social History     Tobacco Use    Smoking status: Never    Smokeless tobacco: Never    Substance Use Topics    Alcohol use: Not on file     History reviewed. No pertinent family history.  History   Drug Use Not on file         Objective     /59   Pulse 75   Temp 97.7  F (36.5  C) (Tympanic)   Resp 16   Wt 82.6 kg (182 lb)   SpO2 98%   BMI 28.51 kg/m      Physical Exam    GENERAL APPEARANCE: healthy, alert and no distress     EYES: EOMI,  PERRL     HENT: ear canals and TM's normal and nose and mouth without ulcers or lesions     NECK: no adenopathy, no asymmetry, masses, or scars and thyroid normal to palpation     RESP: lungs clear to auscultation - no rales, rhonchi or wheezes     CV: regular rates and rhythm, normal S1 S2, no S3 or S4 and no murmur, click or rub     ABDOMEN:  soft, nontender, no HSM or masses and bowel sounds normal     MS: extremities normal- no gross deformities noted, no evidence of inflammation in joints, FROM in all extremities.     SKIN: no suspicious lesions or rashes     NEURO: Normal strength and tone, sensory exam grossly normal, mentation intact and speech normal     PSYCH: mentation appears normal. and affect normal/bright     LYMPHATICS: No cervical adenopathy    Recent Labs   Lab Test 03/17/23  0735      POTASSIUM 4.2   CR 0.98   A1C 7.6*        Diagnostics:  Labs pending at this time.  Results will be reviewed when available.   EKG: appears normal, NSR, normal axis, normal intervals, no acute ST/T changes c/w ischemia, no LVH by voltage criteria, unchanged from previous tracings    Revised Cardiac Risk Index (RCRI):  The patient has the following serious cardiovascular risks for perioperative complications:   - No serious cardiac risks = 0 points     RCRI Interpretation: 0 points: Class I (very low risk - 0.4% complication rate)         Signed Electronically by: LONG MELCHOR PA-C  Copy of this evaluation report is provided to requesting physician.

## 2023-09-27 NOTE — PATIENT INSTRUCTIONS
Preparing for Your Surgery  Getting started  A nurse will call you to review your health history and instructions. They will give you an arrival time based on your scheduled surgery time. Please be ready to share:  Your doctor's clinic name and phone number  Your medical, surgical, and anesthesia history  A list of allergies and sensitivities  A list of medicines, including herbal treatments and over-the-counter drugs  Whether the patient has a legal guardian (ask how to send us the papers in advance)  Please tell us if you're pregnant--or if there's any chance you might be pregnant. Some surgeries may injure a fetus (unborn baby), so they require a pregnancy test. Surgeries that are safe for a fetus don't always need a test, and you can choose whether to have one.   If you have a child who's having surgery, please ask for a copy of Preparing for Your Child's Surgery.    Preparing for surgery  Within 10 to 30 days of surgery: Have a pre-op exam (sometimes called an H&P, or History and Physical). This can be done at a clinic or pre-operative center.  If you're having a , you may not need this exam. Talk to your care team.  At your pre-op exam, talk to your care team about all medicines you take. If you need to stop any medicines before surgery, ask when to start taking them again.  We do this for your safety. Many medicines can make you bleed too much during surgery. Some change how well surgery (anesthesia) drugs work.  Call your insurance company to let them know you're having surgery. (If you don't have insurance, call [phone].)  Call your clinic if there's any change in your health. This includes signs of a cold or flu (sore throat, runny nose, cough, rash, fever). It also includes a scrape or scratch near the surgery site.  If you have questions on the day of surgery, call your hospital or surgery center.  Eating and drinking guidelines  For your safety: Unless your surgeon tells you otherwise,  follow the guidelines below.  Eat and drink as usual until 8 hours before you arrive for surgery. After that, no food or milk.  Drink clear liquids until 2 hours before you arrive. These are liquids you can see through, like water, Gatorade, and Propel Water. They also include plain black coffee and tea (no cream or milk), candy, and breath mints. You can spit out gum when you arrive.  If you drink alcohol: Stop drinking it the night before surgery.  If your care team tells you to take medicine on the morning of surgery, it's okay to take it with a sip of water.  Preventing infection  Shower or bathe the night before and morning of your surgery. Follow the instructions your clinic gave you. (If no instructions, use regular soap.)  Don't shave or clip hair near your surgery site. We'll remove the hair if needed.  Don't smoke or vape the morning of surgery. You may chew nicotine gum up to 2 hours before surgery. A nicotine patch is okay.  Note: Some surgeries require you to completely quit smoking and nicotine. Check with your surgeon.  Your care team will make every effort to keep you safe from infection. We will:  Clean our hands often with soap and water (or an alcohol-based hand rub).  Clean the skin at your surgery site with a special soap that kills germs.  Give you a special gown to keep you warm. (Cold raises the risk of infection.)  Wear special hair covers, masks, gowns and gloves during surgery.  Give antibiotic medicine, if prescribed. Not all surgeries need antibiotics.  What to bring on the day of surgery  Photo ID and insurance card  Copy of your health care directive, if you have one  Glasses and hearing aids (bring cases)  You can't wear contacts during surgery  Inhaler and eye drops, if you use them (tell us about these when you arrive)  CPAP machine or breathing device, if you use them  A few personal items, if spending the night  If you have . . .  A pacemaker, ICD (cardiac defibrillator) or other  implant: Bring the ID card.  An implanted stimulator: Bring the remote control.  A legal guardian: Bring a copy of the certified (court-stamped) guardianship papers.  Please remove any jewelry, including body piercings. Leave jewelry and other valuables at home.  If you're going home the day of surgery  You must have a responsible adult drive you home. They should stay with you overnight as well.  If you don't have someone to stay with you, and you aren't safe to go home alone, we may keep you overnight. Insurance often won't pay for this.  After surgery  If it's hard to control your pain or you need more pain medicine, please call your surgeon's office.  Questions?   If you have any questions for your care team, list them here: _________________________________________________________________________________________________________________________________________________________________________ ____________________________________ ____________________________________ ____________________________________  For informational purposes only. Not to replace the advice of your health care provider. Copyright   2003, 2019 [employer]. All rights reserved. Clinically reviewed by Naya Yusuf MD. [employer] 596070 - REV 12/22.    How to Take Your Medication Before Surgery  - 80% lantus pm before surgery. All other medications ok to take

## 2023-10-03 RX ORDER — ONDANSETRON 2 MG/ML
4 INJECTION INTRAMUSCULAR; INTRAVENOUS
Status: CANCELLED | OUTPATIENT
Start: 2023-10-03

## 2023-10-03 RX ORDER — LIDOCAINE 40 MG/G
CREAM TOPICAL
Status: CANCELLED | OUTPATIENT
Start: 2023-10-03

## 2023-10-05 ENCOUNTER — TELEPHONE (OUTPATIENT)
Dept: GASTROENTEROLOGY | Facility: CLINIC | Age: 58
End: 2023-10-05
Payer: COMMERCIAL

## 2023-10-05 NOTE — TELEPHONE ENCOUNTER
Caller:   Reason for Reschedule/Cancellation (please be detailed, any staff messages or encounters to note?): RESCHEDULE      Prior to reschedule please review:  Ordering Provider: LONG MELCHOR   Sedation per order: CS  Does patient have any ASC Exclusions, please identify?: KAY      Notes on Cancelled Procedure:  Procedure: Lower Endoscopy [Colonoscopy]   Date: 10/4  Location: [facility]; [address] [place] [address]  Surgeon: CHUNG      Rescheduled: Yes  Procedure: Lower Endoscopy [Colonoscopy]   Date: 12/27  Location: [facility]; [address] [place] [address]  Surgeon: CHUNG  Sedation Level Scheduled  MAC,  Reason for Sedation Level PER MESSAGE GI LUMINAL  Prep/Instructions updated and sent: Y       Send In - basket message to Panc - Andrey Pool if EUS  procedure is canceled or rescheduled: [ N/A, YES or NO]

## 2023-10-09 ENCOUNTER — ALLIED HEALTH/NURSE VISIT (OUTPATIENT)
Dept: EDUCATION SERVICES | Facility: CLINIC | Age: 58
End: 2023-10-09
Payer: COMMERCIAL

## 2023-10-09 DIAGNOSIS — E11.9 TYPE 2 DIABETES MELLITUS WITHOUT COMPLICATION, WITH LONG-TERM CURRENT USE OF INSULIN (H): Primary | ICD-10-CM

## 2023-10-09 DIAGNOSIS — Z79.4 TYPE 2 DIABETES MELLITUS WITHOUT COMPLICATION, WITH LONG-TERM CURRENT USE OF INSULIN (H): Primary | ICD-10-CM

## 2023-10-09 PROCEDURE — G0108 DIAB MANAGE TRN  PER INDIV: HCPCS | Mod: AE

## 2023-10-09 RX ORDER — BLOOD-GLUCOSE SENSOR
1 EACH MISCELLANEOUS CONTINUOUS
Qty: 1 EACH | Refills: 0 | Status: SHIPPED | OUTPATIENT
Start: 2023-10-09 | End: 2023-10-23

## 2023-10-09 RX ORDER — BLOOD-GLUCOSE SENSOR
1 EACH MISCELLANEOUS CONTINUOUS
Qty: 6 EACH | Refills: 11 | Status: SHIPPED | OUTPATIENT
Start: 2023-10-09 | End: 2023-10-23

## 2023-10-09 NOTE — PROGRESS NOTES
Diabetes Self-Management Education & Support    Presents for: Individual review    Type of Service: In Person Visit    Assessment Type:   ASSESSMENT:      Patient's most recent   Lab Results   Component Value Date    A1C 10.8 09/27/2023     is not meeting goal of <7.0    Diabetes knowledge and skills assessment:   Patient is knowledgeable in diabetes management concepts related to: Healthy Eating, Being Active, Monitoring, Taking Medication, Problem Solving, Reducing Risks, and Healthy Coping    Continue education with the following diabetes management concepts: Healthy Eating, Being Active, Monitoring, Taking Medication, Problem Solving, and Reducing Risks    Based on learning assessment above, most appropriate setting for further diabetes education would be: Individual setting.      PLAN  Stop Glimepiride now  Trulicity 0.75 mg weekly  Lantus at 28 units daily  Metformin 1000 mg take 1 pill with breakfast and 1 pill with dinner  3 meals per day, 45-75 gms of carbs each meal and protein and 2-3 snacks per day with 15-30 gms  Walking extra about 10-20 minutes per every other day above the walk at work.   Drinking plenty of water, 64 oz per day.   Let me know if having any lows please send me know  Get your current email on your phone.  [email or web address]    Topics to cover at upcoming visits: Healthy Eating, Monitoring, Taking Medication, and Problem Solving    Follow-up:     See Care Plan for co-developed, patient-state behavior change goals.  AVS provided for patient today.    Education Materials Provided:  [facility] Understanding Diabetes Booklet, Carbohydrate Counting, Medication Information on Trulicity, Hypoglycemia Signs and Symptoms, and My Plate Planner      SUBJECTIVE/OBJECTIVE:  Presents for: Individual review  Accompanied by: Self  Diabetes education in the past 24mo: No  Focus of Visit: Monitoring, Healthy Eating, Diabetes Pathophysiology, Assistance w/ making life changes  Diabetes type: Type  "2  Disease course: Worsening  How confident are you filling out medical forms by yourself:: Extremely  Transportation concerns: No  Difficulty affording diabetes medication?: No  Difficulty affording diabetes testing supplies?: No  Other concerns:: None  Cultural Influences/Ethnic Background:   or       Diabetes Symptoms & Complications:  Fatigue: No  Neuropathy: No  Polydipsia: No  Polyphagia: No  Polyuria: Sometimes  Visual change: No  Slow healing wounds: No  Symptom course: Improving  Weight trend: Stable  Autonomic neuropathy: No  CVA: No  Heart disease: No  Nephropathy: No  Peripheral neuropathy: No  Peripheral Vascular Disease: No  Retinopathy: No  Sexual dysfunction: Yes    Patient Problem List and Family Medical History reviewed for relevant medical history, current medical status, and diabetes risk factors.    Vitals:  There were no vitals taken for this visit.  Estimated body mass index is 28.51 kg/m  as calculated from the following:    Height as of 3/17/23: 1.702 m (5' 7\").    Weight as of 9/27/23: 82.6 kg (182 lb).   Last 3 BP:   BP Readings from Last 3 Encounters:   09/27/23 136/59   03/17/23 120/71   01/15/14 118/74       History   Smoking Status    Never   Smokeless Tobacco    Never       Labs:  Lab Results   Component Value Date    A1C 10.8 09/27/2023     Lab Results   Component Value Date     09/27/2023     03/17/2023     Lab Results   Component Value Date    LDL 54 03/17/2023     Direct Measure HDL   Date Value Ref Range Status   03/17/2023 37 (L) >=40 mg/dL Final   ]  GFR Estimate   Date Value Ref Range Status   09/27/2023 >90 >60 mL/min/1.73m2 Final     No results found for: GFRESTBLACK  Lab Results   Component Value Date    CR 0.84 09/27/2023     No results found for: MICROALBUMIN    Healthy Eating:  Healthy Eating Assessed Today: Yes  Cultural/Denominational diet restrictions?: No  Meal planning/habits: Avoiding sweets, Plate planning method  How many times a week on " average do you eat food made away from home (restaurant/take-out)?: 5+  Meals include: Breakfast  Breakfast: 0700 2 cups of cheerios and milk and 1/2 cup of fruit, 1 cup of coffee or bagel and cream cheese, or sausage McMuffin  Lunch: eats 1-3:00 Chx salad with bread and water, 2 chx corn tortilla, or scrambled eggs and corn chowder soup,  Dinner: eats 8:30-9:30 pasta and tomato sauce or chx sandwich and small fries from [employer], or janna salad, and SF jello, or Mac and cheese and SF jello  Snacks: not many  Beverages: Water, Coffee    Being Active:  Being Active Assessed Today: Yes  Exercise:: Yes  Days per week of moderate to strenuous exercise (like a brisk walk): 2  On average, minutes per day of exercise at this level: 10  How intense was your typical exercise? : Light (like stretching or slow walking)  Exercise Minutes per Week: 20  Barrier to exercise: Time    Monitoring:  Monitoring Assessed Today: Yes  Did patient bring glucose meter to appointment? : Yes  Blood Glucose Meter: Accu-chek  Times checking blood sugar at home (number): 1  Times checking blood sugar at home (per): Day  Blood glucose trend: Increasing      Forgot meter at home, placed CGM on him today    Taking Medications:  Diabetes Medication(s)       Biguanides       metFORMIN (GLUCOPHAGE) 1000 MG tablet    Take 1 tablet (1,000 mg) by mouth 2 times daily (with meals)      Insulin       insulin glargine (LANTUS PEN) 100 UNIT/ML pen    Inject 28 Units Subcutaneous At Bedtime      Incretin Mimetic Agents       dulaglutide (TRULICITY) 0.75 MG/0.5ML pen    Inject 0.75 mg Subcutaneous every 7 days            Taking Medication Assessed Today: Yes  Current Treatments: Insulin Injections, Oral Medication (taken by mouth), Non-insulin Injectables  Given by: Patient  Injection/Infusion sites: Abdomen  Problems taking diabetes medications regularly?: No  Diabetes medication side effects?: No    Problem Solving:  Problem Solving Assessed Today: No  Is the  patient at risk for hypoglycemia?: No  Is the patient at risk for DKA?: No  Does patient have severe weather/disaster plan for diabetes management?: Not Needed              Reducing Risks:  Reducing Risks Assessed Today: Yes  Diabetes Risks: Age over 45 years, Family History, Ethnicity, Sedentary Lifestyle  CAD Risks: Diabetes Mellitus, Male sex, Obesity, Sedentary lifestyle  Has dilated eye exam at least once a year?: No  Sees dentist every 6 months?: Yes  Feet checked by healthcare provider in the last year?: No    Healthy Coping:  Healthy Coping Assessed Today: Yes  Emotional response to diabetes: Ready to learn, Acceptance, Confidence diabetes can be controlled  Informal Support system:: Partner  Stage of change: PREPARATION (Decided to change - considering how)  Patient Activation Measure Survey Score:      9/27/2023     7:00 AM   FEDERICO Score (Last Two)   FEDERICO Raw Score 40   Activation Score 100   FEDERIOC Level 4         Care Plan and Education Provided:  Care Plan: Diabetes   Updates made by Jennifer Schilling RN since 10/9/2023 12:00 AM        Problem: HbA1C Not In Goal         Goal: Establish Regular Follow-Ups with PCP    Start Date: 10/9/2023        Task: Discuss with PCP the recommended timing for patient's next follow up visit(s) Completed 10/9/2023   Responsible User: Jennifer Schilling RN        Task: Discuss schedule for PCP visits with patient Completed 10/9/2023   Responsible User: Jennifer Schilling RN        Goal: Get HbA1C Level in Goal    Start Date: 10/9/2023   This Visit's Progress: 0%        Task: Educate patient on diabetes education self-management topics Completed 10/9/2023   Responsible User: Jennifer Schilling RN        Task: Educate patient on benefits of regular glucose monitoring Completed 10/9/2023   Responsible User: Jennifer Schilling RN        Task: Refer patient to appropriate extended care team member, as needed (Medication Therapy Management, Behavioral Health, Physical Therapy, etc.)    Responsible  User: Jennifer Schilling RN        Task: Discuss diabetes treatment plan with patient Completed 10/9/2023   Responsible User: Jennifer Schilling RN        Problem: Diabetes Self-Management Education Needed to Optimize Self-Care Behaviors         Goal: Understand diabetes pathophysiology and disease progression    Start Date: 10/9/2023   This Visit's Progress: 50%        Task: Provide education on diabetes pathophysiology and disease progression specfic to patient's diabetes type Completed 10/9/2023   Responsible User: Jennifer Schilling RN        Goal: Healthy Eating - follow a healthy eating pattern for diabetes    Start Date: 10/9/2023   This Visit's Progress: 40%        Task: Provide education on portion control and consistency in amount, composition and timing of food intake Completed 10/9/2023   Responsible User: Jennifer Schilling RN        Task: Provide education on managing carbohydrate intake (carbohydrate counting, plate planning method, etc.) Completed 10/9/2023   Responsible User: Jennifer Schilling RN        Task: Provide education on weight management    Responsible User: Jennifer Schilling RN        Task: Provide education on heart healthy eating Completed 10/9/2023   Responsible User: Jennifer Schilling RN        Task: Provide education on eating out Completed 10/9/2023   Responsible User: Jennifer Schilling RN        Task: Develop individualized healthy eating plan with patient Completed 10/9/2023   Responsible User: Jennifer Schilling RN        Goal: Being Active - get regular physical activity, working up to at least 150 minutes per week    Start Date: 10/9/2023   This Visit's Progress: 10%        Task: Provide education on relationship of activity to glucose and precautions to take if at risk for low glucose Completed 10/9/2023   Responsible User: Jennifer Schilling RN        Task: Discuss barriers to physical activity with patient Completed 10/9/2023   Responsible User: Jennifer Schilling RN        Task: Develop physical  activity plan with patient Completed 10/9/2023   Responsible User: Jennifer Schilling RN        Task: Explore community resources including walking groups, assistance programs, and home videos    Responsible User: Jennifer Schilling RN        Goal: Monitoring - monitor glucose and ketones as directed    Start Date: 10/9/2023   This Visit's Progress: 60%        Task: Provide education on blood glucose monitoring (purpose, proper technique, frequency, glucose targets, interpreting results, when to use glucose control solution, sharps disposal) Completed 10/9/2023   Responsible User: Jennifer Schilling RN        Task: Provide education on continuous glucose monitoring (sensor placement, use of facundo or /reader, understanding glucose trends, alerts and alarms, differences between sensor glucose and blood glucose) Completed 10/9/2023   Responsible User: Jennifer Schilling RN        Task: Provide education on ketone monitoring (when to monitor, frequency, etc.)    Responsible User: Jennifer Schilling RN        Goal: Taking Medication - patient is consistently taking medications as directed    Start Date: 10/9/2023   This Visit's Progress: 90%        Task: Provide education on action of prescribed medication, including when to take and possible side effects Completed 10/9/2023   Responsible User: Jennifer Schilling RN        Task: Provide education on insulin and injectable diabetes medications, including administration, storage, site selection and rotation for injection sites Completed 10/9/2023   Responsible User: Jennifer Schilling RN        Task: Discuss barriers to medication adherence with patient and provide management technique ideas as appropriate Completed 10/9/2023   Responsible User: Jennifer Schilling RN        Task: Provide education on frequency and refill details of medications Completed 10/9/2023   Responsible User: Jennifer Schilling RN        Goal: Problem Solving - know how to prevent and manage short-term diabetes  complications    Start Date: 10/9/2023   This Visit's Progress: 60%        Task: Provide education on high blood glucose - causes, signs/symptoms, prevention and treatment Completed 10/9/2023   Responsible User: Jennifer Schilling RN        Task: Provide education on low blood glucose - causes, signs/symptoms, prevention, treatment, carrying a carbohydrate source at all times, and medical identification Completed 10/9/2023   Responsible User: Jennifer Schilling RN        Task: Provide education on safe travel with diabetes    Responsible User: Jennifer Schilling RN        Task: Provide education on how to care for diabetes on sick days    Responsible User: Jennifer Schilling RN        Task: Provide education on when to call a health care provider Completed 10/9/2023   Responsible User: Jennifer Schilling RN        Goal: Reducing Risks - know how to prevent and treat long-term diabetes complications    Start Date: 10/9/2023   This Visit's Progress: 50%        Task: Provide education on major complications of diabetes, prevention, early diagnostic measures and treatment of complications Completed 10/9/2023   Responsible User: Jennifer Schilling RN        Task: Provide education on recommended care for dental, eye and foot health Completed 10/9/2023   Responsible User: Jennifer Schilling RN        Task: Provide education on Hemoglobin A1c - goals and relationship to blood glucose levels Completed 10/9/2023   Responsible User: Jennifer Schilling RN        Task: Provide education on recommendations for heart health - lipid levels and goals, blood pressure and goals, and aspirin therapy, if indicated Completed 10/9/2023   Responsible User: Jennifer Schilling RN        Task: Provide education on tobacco cessation    Responsible User: Jennifer Schilling RN        Goal: Healthy Coping - use available resources to cope with the challenges of managing diabetes    Start Date: 10/9/2023   This Visit's Progress: 80%        Task: Discuss recognizing feelings  about having diabetes Completed 10/9/2023   Responsible User: Jennifer Schilling RN        Task: Provide education on the benefits of making appropriate lifestyle changes Completed 10/9/2023   Responsible User: Jennifer Schilling RN        Task: Provide education on benefits of utilizing support systems Completed 10/9/2023   Responsible User: Jennifer Schilling RN        Task: Discuss methods for coping with stress    Responsible User: Jennifer Schilling RN        Task: Provide education on when to seek professional counseling    Responsible User: Jennifer Schilling RN Sue Truhler RN/RAJEEV Gamble Diabetes Educator      Time Spent: 60 minutes  Encounter Type: Individual    Any diabetes medication dose changes were made via the CDE Protocol per the patient's primary care provider. A copy of this encounter was shared with the provider.

## 2023-10-09 NOTE — LETTER
10/9/2023         RE: Alejandro Flores  [address] [facility] [address]        Dear Colleague,    Thank you for referring your patient, Alejandro Flores, to the [facility]. Please see a copy of my visit note below.    Diabetes Self-Management Education & Support    Presents for: Individual review    Type of Service: In Person Visit    Assessment Type:   ASSESSMENT:      Patient's most recent   Lab Results   Component Value Date    A1C 10.8 09/27/2023     is not meeting goal of <7.0    Diabetes knowledge and skills assessment:   Patient is knowledgeable in diabetes management concepts related to: Healthy Eating, Being Active, Monitoring, Taking Medication, Problem Solving, Reducing Risks, and Healthy Coping    Continue education with the following diabetes management concepts: Healthy Eating, Being Active, Monitoring, Taking Medication, Problem Solving, and Reducing Risks    Based on learning assessment above, most appropriate setting for further diabetes education would be: Individual setting.      PLAN  Stop Glimepiride now  Trulicity 0.75 mg weekly  Lantus at 28 units daily  Metformin 1000 mg take 1 pill with breakfast and 1 pill with dinner  3 meals per day, 45-75 gms of carbs each meal and protein and 2-3 snacks per day with 15-30 gms  Walking extra about 10-20 minutes per every other day above the walk at work.   Drinking plenty of water, 64 oz per day.   Let me know if having any lows please send me know  Get your current email on your phone.  [email or web address]    Topics to cover at upcoming visits: Healthy Eating, Monitoring, Taking Medication, and Problem Solving    Follow-up:     See Care Plan for co-developed, patient-state behavior change goals.  AVS provided for patient today.    Education Materials Provided:  [facility] Understanding Diabetes Booklet, Carbohydrate Counting, Medication Information on Trulicity, Hypoglycemia Signs and Symptoms, and My  "Plate Planner      SUBJECTIVE/OBJECTIVE:  Presents for: Individual review  Accompanied by: Self  Diabetes education in the past 24mo: No  Focus of Visit: Monitoring, Healthy Eating, Diabetes Pathophysiology, Assistance w/ making life changes  Diabetes type: Type 2  Disease course: Worsening  How confident are you filling out medical forms by yourself:: Extremely  Transportation concerns: No  Difficulty affording diabetes medication?: No  Difficulty affording diabetes testing supplies?: No  Other concerns:: None  Cultural Influences/Ethnic Background:   or       Diabetes Symptoms & Complications:  Fatigue: No  Neuropathy: No  Polydipsia: No  Polyphagia: No  Polyuria: Sometimes  Visual change: No  Slow healing wounds: No  Symptom course: Improving  Weight trend: Stable  Autonomic neuropathy: No  CVA: No  Heart disease: No  Nephropathy: No  Peripheral neuropathy: No  Peripheral Vascular Disease: No  Retinopathy: No  Sexual dysfunction: Yes    Patient Problem List and Family Medical History reviewed for relevant medical history, current medical status, and diabetes risk factors.    Vitals:  There were no vitals taken for this visit.  Estimated body mass index is 28.51 kg/m  as calculated from the following:    Height as of 3/17/23: 1.702 m (5' 7\").    Weight as of 9/27/23: 82.6 kg (182 lb).   Last 3 BP:   BP Readings from Last 3 Encounters:   09/27/23 136/59   03/17/23 120/71   01/15/14 118/74       History   Smoking Status     Never   Smokeless Tobacco     Never       Labs:  Lab Results   Component Value Date    A1C 10.8 09/27/2023     Lab Results   Component Value Date     09/27/2023     03/17/2023     Lab Results   Component Value Date    LDL 54 03/17/2023     Direct Measure HDL   Date Value Ref Range Status   03/17/2023 37 (L) >=40 mg/dL Final   ]  GFR Estimate   Date Value Ref Range Status   09/27/2023 >90 >60 mL/min/1.73m2 Final     No results found for: GFRESTBLACK  Lab Results "   Component Value Date    CR 0.84 09/27/2023     No results found for: MICROALBUMIN    Healthy Eating:  Healthy Eating Assessed Today: Yes  Cultural/Episcopalian diet restrictions?: No  Meal planning/habits: Avoiding sweets, Plate planning method  How many times a week on average do you eat food made away from home (restaurant/take-out)?: 5+  Meals include: Breakfast  Breakfast: 0700 2 cups of cheerios and milk and 1/2 cup of fruit, 1 cup of coffee or bagel and cream cheese, or sausage McMuffin  Lunch: eats 1-3:00 Chx salad with bread and water, 2 chx corn tortilla, or scrambled eggs and corn chowder soup,  Dinner: eats 8:30-9:30 pasta and tomato sauce or chx sandwich and small fries from [employer], or janna salad, and SF jello, or Mac and cheese and SF jello  Snacks: not many  Beverages: Water, Coffee    Being Active:  Being Active Assessed Today: Yes  Exercise:: Yes  Days per week of moderate to strenuous exercise (like a brisk walk): 2  On average, minutes per day of exercise at this level: 10  How intense was your typical exercise? : Light (like stretching or slow walking)  Exercise Minutes per Week: 20  Barrier to exercise: Time    Monitoring:  Monitoring Assessed Today: Yes  Did patient bring glucose meter to appointment? : Yes  Blood Glucose Meter: Accu-chek  Times checking blood sugar at home (number): 1  Times checking blood sugar at home (per): Day  Blood glucose trend: Increasing      Forgot meter at home, placed CGM on him today    Taking Medications:  Diabetes Medication(s)       Biguanides       metFORMIN (GLUCOPHAGE) 1000 MG tablet    Take 1 tablet (1,000 mg) by mouth 2 times daily (with meals)      Insulin       insulin glargine (LANTUS PEN) 100 UNIT/ML pen    Inject 28 Units Subcutaneous At Bedtime      Incretin Mimetic Agents       dulaglutide (TRULICITY) 0.75 MG/0.5ML pen    Inject 0.75 mg Subcutaneous every 7 days            Taking Medication Assessed Today: Yes  Current Treatments: Insulin  Injections, Oral Medication (taken by mouth), Non-insulin Injectables  Given by: Patient  Injection/Infusion sites: Abdomen  Problems taking diabetes medications regularly?: No  Diabetes medication side effects?: No    Problem Solving:  Problem Solving Assessed Today: No  Is the patient at risk for hypoglycemia?: No  Is the patient at risk for DKA?: No  Does patient have severe weather/disaster plan for diabetes management?: Not Needed              Reducing Risks:  Reducing Risks Assessed Today: Yes  Diabetes Risks: Age over 45 years, Family History, Ethnicity, Sedentary Lifestyle  CAD Risks: Diabetes Mellitus, Male sex, Obesity, Sedentary lifestyle  Has dilated eye exam at least once a year?: No  Sees dentist every 6 months?: Yes  Feet checked by healthcare provider in the last year?: No    Healthy Coping:  Healthy Coping Assessed Today: Yes  Emotional response to diabetes: Ready to learn, Acceptance, Confidence diabetes can be controlled  Informal Support system:: Partner  Stage of change: PREPARATION (Decided to change - considering how)  Patient Activation Measure Survey Score:      9/27/2023     7:00 AM   FEDERICO Score (Last Two)   FEDERICO Raw Score 40   Activation Score 100   FEDERICO Level 4         Care Plan and Education Provided:  Care Plan: Diabetes   Updates made by Jennifer Schilling RN since 10/9/2023 12:00 AM        Problem: HbA1C Not In Goal         Goal: Establish Regular Follow-Ups with PCP    Start Date: 10/9/2023        Task: Discuss with PCP the recommended timing for patient's next follow up visit(s) Completed 10/9/2023   Responsible User: Jennifer Schilling RN        Task: Discuss schedule for PCP visits with patient Completed 10/9/2023   Responsible User: Jennifer Schilling RN        Goal: Get HbA1C Level in Goal    Start Date: 10/9/2023   This Visit's Progress: 0%        Task: Educate patient on diabetes education self-management topics Completed 10/9/2023   Responsible User: Jennifer Schilling RN        Task:  Educate patient on benefits of regular glucose monitoring Completed 10/9/2023   Responsible User: Jennifer Schilling RN        Task: Refer patient to appropriate extended care team member, as needed (Medication Therapy Management, Behavioral Health, Physical Therapy, etc.)    Responsible User: Jennifer Schilling RN        Task: Discuss diabetes treatment plan with patient Completed 10/9/2023   Responsible User: Jennifer Schilling RN        Problem: Diabetes Self-Management Education Needed to Optimize Self-Care Behaviors         Goal: Understand diabetes pathophysiology and disease progression    Start Date: 10/9/2023   This Visit's Progress: 50%        Task: Provide education on diabetes pathophysiology and disease progression specfic to patient's diabetes type Completed 10/9/2023   Responsible User: Jennifer Schilling RN        Goal: Healthy Eating - follow a healthy eating pattern for diabetes    Start Date: 10/9/2023   This Visit's Progress: 40%        Task: Provide education on portion control and consistency in amount, composition and timing of food intake Completed 10/9/2023   Responsible User: Jennifer Schilling RN        Task: Provide education on managing carbohydrate intake (carbohydrate counting, plate planning method, etc.) Completed 10/9/2023   Responsible User: Jennifer Schilling RN        Task: Provide education on weight management    Responsible User: Jennifer Schilling RN        Task: Provide education on heart healthy eating Completed 10/9/2023   Responsible User: Jennifer Schilling RN        Task: Provide education on eating out Completed 10/9/2023   Responsible User: Jennifer Schilling RN        Task: Develop individualized healthy eating plan with patient Completed 10/9/2023   Responsible User: Jennifer Schilling RN        Goal: Being Active - get regular physical activity, working up to at least 150 minutes per week    Start Date: 10/9/2023   This Visit's Progress: 10%        Task: Provide education on relationship of  activity to glucose and precautions to take if at risk for low glucose Completed 10/9/2023   Responsible User: Jennifer Schilling RN        Task: Discuss barriers to physical activity with patient Completed 10/9/2023   Responsible User: Jennifer Schilling RN        Task: Develop physical activity plan with patient Completed 10/9/2023   Responsible User: Jennifer Schilling RN        Task: Explore community resources including walking groups, assistance programs, and home videos    Responsible User: Jennifer Schilling RN        Goal: Monitoring - monitor glucose and ketones as directed    Start Date: 10/9/2023   This Visit's Progress: 60%        Task: Provide education on blood glucose monitoring (purpose, proper technique, frequency, glucose targets, interpreting results, when to use glucose control solution, sharps disposal) Completed 10/9/2023   Responsible User: Jennifer Schilling RN        Task: Provide education on continuous glucose monitoring (sensor placement, use of facundo or /reader, understanding glucose trends, alerts and alarms, differences between sensor glucose and blood glucose) Completed 10/9/2023   Responsible User: Jennifer Schilling RN        Task: Provide education on ketone monitoring (when to monitor, frequency, etc.)    Responsible User: Jennifer Schilling RN        Goal: Taking Medication - patient is consistently taking medications as directed    Start Date: 10/9/2023   This Visit's Progress: 90%        Task: Provide education on action of prescribed medication, including when to take and possible side effects Completed 10/9/2023   Responsible User: Jennifer Schilling RN        Task: Provide education on insulin and injectable diabetes medications, including administration, storage, site selection and rotation for injection sites Completed 10/9/2023   Responsible User: Jennifer Schilling RN        Task: Discuss barriers to medication adherence with patient and provide management technique ideas as appropriate  Completed 10/9/2023   Responsible User: Jennifer Schilling RN        Task: Provide education on frequency and refill details of medications Completed 10/9/2023   Responsible User: Jennifer Schilling RN        Goal: Problem Solving - know how to prevent and manage short-term diabetes complications    Start Date: 10/9/2023   This Visit's Progress: 60%        Task: Provide education on high blood glucose - causes, signs/symptoms, prevention and treatment Completed 10/9/2023   Responsible User: Jennifer Schilling RN        Task: Provide education on low blood glucose - causes, signs/symptoms, prevention, treatment, carrying a carbohydrate source at all times, and medical identification Completed 10/9/2023   Responsible User: Jennifer Schilling RN        Task: Provide education on safe travel with diabetes    Responsible User: Jennifer Schilling RN        Task: Provide education on how to care for diabetes on sick days    Responsible User: Jennifer Schilling RN        Task: Provide education on when to call a health care provider Completed 10/9/2023   Responsible User: Jennifer Schilling RN        Goal: Reducing Risks - know how to prevent and treat long-term diabetes complications    Start Date: 10/9/2023   This Visit's Progress: 50%        Task: Provide education on major complications of diabetes, prevention, early diagnostic measures and treatment of complications Completed 10/9/2023   Responsible User: Jennifer Schilling RN        Task: Provide education on recommended care for dental, eye and foot health Completed 10/9/2023   Responsible User: Jennifer Schilling RN        Task: Provide education on Hemoglobin A1c - goals and relationship to blood glucose levels Completed 10/9/2023   Responsible User: Jennifer Schilling RN        Task: Provide education on recommendations for heart health - lipid levels and goals, blood pressure and goals, and aspirin therapy, if indicated Completed 10/9/2023   Responsible User: Jennifer Schilling RN        Task:  Provide education on tobacco cessation    Responsible User: Jennifer Schilling RN        Goal: Healthy Coping - use available resources to cope with the challenges of managing diabetes    Start Date: 10/9/2023   This Visit's Progress: 80%        Task: Discuss recognizing feelings about having diabetes Completed 10/9/2023   Responsible User: Jennifer Schilling RN        Task: Provide education on the benefits of making appropriate lifestyle changes Completed 10/9/2023   Responsible User: Jennifer Schilling RN        Task: Provide education on benefits of utilizing support systems Completed 10/9/2023   Responsible User: Jennifer Schilling RN        Task: Discuss methods for coping with stress    Responsible User: Jennifer Schilling RN        Task: Provide education on when to seek professional counseling    Responsible User: Jennifer Schilling RN Sue Truhler RN/RAJEEV Gamble Diabetes Educator      Time Spent: 60 minutes  Encounter Type: Individual    Any diabetes medication dose changes were made via the CDE Protocol per the patient's primary care provider. A copy of this encounter was shared with the provider.

## 2023-10-09 NOTE — PATIENT INSTRUCTIONS
Diabetes Support Resources:  Stop Glimepiride now  Trulicity 0.75 mg weekly  Lantus at 28 units daily  Metformin 1000 mg take 1 pill with breakfast and 1 pill with dinner  3 meals per day, 45-75 gms of carbs each meal and protein and 2-3 snacks per day with 15-30 gms  Walking extra about 10-20 minutes per every other day above the walk at work.   Drinking plenty of water, 64 oz per day.   Let me know if having any lows please send me know  Get your current email on your phone.  [email or web address]     Bring blood glucose meter and logbook with you to all doctor and follow-up appointments.    Diabetes Education Telephone Visit Follow-up:    We realize your time is valuable and your health is important! We offer a convenient Telephone Visit follow up! It s a quick way to check in for a medication dose adjustment without having to come back to clinic as soon.    Telephone Visits are often covered by insurance. Please check with your insurance plan to see if this type of visit is covered. If not, the cost is less expensive than an office visit:    Up to 10 minutes (Code 13603): $30  11-20 minutes (Code 63232): $59  More than 20 minutes (Code 41330): $85    Talk with your Diabetes Educator if you want to learn more.      [facility]:  For Your Diabetes Education and Nutrition Appointments Call:  [phone]   For Diabetes Education or Nutrition Related Questions:   Phone: [phone]  Send [employer] Message   If you need a medication refill please contact your pharmacy. Please allow 3 business days for your refills to be completed.

## 2023-10-23 ENCOUNTER — ALLIED HEALTH/NURSE VISIT (OUTPATIENT)
Dept: EDUCATION SERVICES | Facility: CLINIC | Age: 58
End: 2023-10-23
Payer: COMMERCIAL

## 2023-10-23 DIAGNOSIS — E11.9 TYPE 2 DIABETES MELLITUS WITHOUT COMPLICATION, WITH LONG-TERM CURRENT USE OF INSULIN (H): ICD-10-CM

## 2023-10-23 DIAGNOSIS — Z79.4 TYPE 2 DIABETES MELLITUS WITHOUT COMPLICATION, WITH LONG-TERM CURRENT USE OF INSULIN (H): ICD-10-CM

## 2023-10-23 PROCEDURE — G0108 DIAB MANAGE TRN  PER INDIV: HCPCS

## 2023-10-23 PROCEDURE — 99207 PR NO CHARGE NURSE ONLY: CPT

## 2023-10-23 RX ORDER — BLOOD-GLUCOSE SENSOR
1 EACH MISCELLANEOUS CONTINUOUS
Qty: 6 EACH | Refills: 11 | Status: SHIPPED | OUTPATIENT
Start: 2023-10-23 | End: 2023-10-23

## 2023-10-23 RX ORDER — BLOOD-GLUCOSE SENSOR
1 EACH MISCELLANEOUS CONTINUOUS
Qty: 6 EACH | Refills: 11 | Status: SHIPPED | OUTPATIENT
Start: 2023-10-23 | End: 2023-10-30

## 2023-10-23 NOTE — LETTER
10/23/2023         RE: Alejandro Flores  [address]  [place] [address]        Dear Colleague,    Thank you for referring your patient, Alejandro Flores, to the [facility]. Please see a copy of my visit note below.    Diabetes Self-Management Education & Support    Presents for: Follow-up    Type of Service: In Person Visit    Assessment Type:   ASSESSMENT:  Alejandro is trying hard to learn how to manage his diabetes using his sensors to learn what foods he can eat and how to keep his BG under better control  Wonderful to work with and is learning so much about foods and looking at his sensors to keep his BG from spiking.  His cultural foods are hard to adjust but he is doing this with the portions    Patient's most recent   Lab Results   Component Value Date    A1C 10.8 09/27/2023     is not meeting goal of <7.0    Diabetes knowledge and skills assessment:   Patient is knowledgeable in diabetes management concepts related to: Monitoring, Taking Medication, Problem Solving, Reducing Risks, and Healthy Coping    Continue education with the following diabetes management concepts: Healthy Eating, Being Active, and Taking Medication    Based on learning assessment above, most appropriate setting for further diabetes education would be: Individual setting.      PLAN  Metformin 1000 mg take 1 pill with breakfast and 1 pill with dinner  Finish the Trulicity 0.75 mg dose today  Next week start the Trulicity 1.5 mg dose weekly  Lantus 30 units daily  Switch white bread to grain bread or wheat.   Protein before all meals will benefit  Have for bedtime snack some meat and cheese and crackers  Look for the serving size and the gms of carbs  Get your pre op first week of Dec.    Topics to cover at upcoming visits: Healthy Eating, Being Active, Monitoring, and Taking Medication    Follow-up:     See Care Plan for co-developed, patient-state behavior change goals.  AVS provided for  "patient today.    Education Materials Provided:  My Plate Planner      SUBJECTIVE/OBJECTIVE:  Presents for: Follow-up  Accompanied by: Self  Diabetes education in the past 24mo: No  Focus of Visit: Monitoring, Healthy Eating, Diabetes Pathophysiology, Assistance w/ making life changes  Diabetes type: Type 2  Disease course: Improving  How confident are you filling out medical forms by yourself:: Extremely  Transportation concerns: No  Difficulty affording diabetes medication?: No  Difficulty affording diabetes testing supplies?: No  Other concerns:: None  Cultural Influences/Ethnic Background:   or       Diabetes Symptoms & Complications:  Fatigue: No  Neuropathy: No  Polydipsia: No  Polyphagia: No  Polyuria: No  Visual change: No  Slow healing wounds: No  Symptom course: Improving  Weight trend: Decreasing  Autonomic neuropathy: No  CVA: No  Heart disease: No  Nephropathy: No  Peripheral neuropathy: No  Peripheral Vascular Disease: No  Retinopathy: No  Sexual dysfunction: Yes    Patient Problem List and Family Medical History reviewed for relevant medical history, current medical status, and diabetes risk factors.    Vitals:  There were no vitals taken for this visit.  Estimated body mass index is 28.51 kg/m  as calculated from the following:    Height as of 3/17/23: 1.702 m (5' 7\").    Weight as of 9/27/23: 82.6 kg (182 lb).   Last 3 BP:   BP Readings from Last 3 Encounters:   09/27/23 136/59   03/17/23 120/71   01/15/14 118/74       History   Smoking Status     Never   Smokeless Tobacco     Never       Labs:  Lab Results   Component Value Date    A1C 10.8 09/27/2023     Lab Results   Component Value Date     09/27/2023     03/17/2023     Lab Results   Component Value Date    LDL 54 03/17/2023     Direct Measure HDL   Date Value Ref Range Status   03/17/2023 37 (L) >=40 mg/dL Final   ]  GFR Estimate   Date Value Ref Range Status   09/27/2023 >90 >60 mL/min/1.73m2 Final     No results " "found for: \"GFRESTBLACK\"  Lab Results   Component Value Date    CR 0.84 09/27/2023     No results found for: \"MICROALBUMIN\"    Healthy Eating:  Healthy Eating Assessed Today: Yes  Cultural/Pentecostalism diet restrictions?: No  Meal planning/habits: Avoiding sweets, Plate planning method  How many times a week on average do you eat food made away from home (restaurant/take-out)?: 5+  Meals include: Breakfast  Breakfast: 0700 2 cups of cheerios and milk and 1/2 cup of fruit, 1 cup of coffee or bagel and cream cheese, or sausage McMuffin  Lunch: eats 1-3:00 Chx salad with bread and water, 2 chx corn tortilla, or scrambled eggs and corn chowder soup,  Dinner: eats 8:30-9:30 pasta and tomato sauce or chx sandwich and small fries from [employer], or janna salad, and SF jello, or Mac and cheese and SF jello  Snacks: meat,some bars  Beverages: Water, Coffee  Has patient met with a dietitian in the past?: No    Being Active:  Being Active Assessed Today: Yes  Exercise:: Yes  Days per week of moderate to strenuous exercise (like a brisk walk): 4  On average, minutes per day of exercise at this level: 30  How intense was your typical exercise? : Light (like stretching or slow walking)  Exercise Minutes per Week: 120  Barrier to exercise: Time    Monitoring:  Monitoring Assessed Today: Yes  Did patient bring glucose meter to appointment? : Yes  Blood Glucose Meter: Accu-chek  Times checking blood sugar at home (number): 5+  Times checking blood sugar at home (per): Day  Blood glucose trend: Decreasing                  Taking Medications:  Diabetes Medication(s)       Biguanides       metFORMIN (GLUCOPHAGE) 1000 MG tablet    Take 1 tablet (1,000 mg) by mouth 2 times daily (with meals)      Insulin       insulin glargine (LANTUS PEN) 100 UNIT/ML pen    Inject 30 Units Subcutaneous at bedtime      Incretin Mimetic Agents       dulaglutide (TRULICITY) 1.5 MG/0.5ML pen    Inject 1.5 mg Subcutaneous every 7 days            Taking " Medication Assessed Today: Yes  Current Treatments: Insulin Injections, Oral Medication (taken by mouth), Non-insulin Injectables  Given by: Patient  Injection/Infusion sites: Abdomen  Problems taking diabetes medications regularly?: No  Diabetes medication side effects?: No    Problem Solving:  Problem Solving Assessed Today: No  Is the patient at risk for hypoglycemia?: No  Is the patient at risk for DKA?: No  Does patient have severe weather/disaster plan for diabetes management?: Not Needed              Reducing Risks:  Reducing Risks Assessed Today: Yes  Diabetes Risks: Age over 45 years, Family History, Ethnicity, Sedentary Lifestyle  CAD Risks: Diabetes Mellitus, Male sex, Obesity, Sedentary lifestyle  Has dilated eye exam at least once a year?: No  Sees dentist every 6 months?: Yes  Feet checked by healthcare provider in the last year?: No    Healthy Coping:  Healthy Coping Assessed Today: Yes  Emotional response to diabetes: Ready to learn, Acceptance, Confidence diabetes can be controlled  Informal Support system:: Partner  Stage of change: ACTION (Actively working towards change)  Patient Activation Measure Survey Score:      9/27/2023     7:00 AM   FEDERICO Score (Last Two)   FEDERICO Raw Score 40   Activation Score 100   FEDERICO Level 4         Care Plan and Education Provided:  Care Plan: Diabetes   Updates made by Jennifer Schilling RN since 10/23/2023 12:00 AM        Problem: HbA1C Not In Goal         Goal: Establish Regular Follow-Ups with PCP    Start Date: 10/23/2023   This Visit's Progress: 80%        Problem: Diabetes Self-Management Education Needed to Optimize Self-Care Behaviors         Goal: Understand diabetes pathophysiology and disease progression    Start Date: 10/23/2023   This Visit's Progress: 70%   Recent Progress: 50%        Goal: Healthy Eating - follow a healthy eating pattern for diabetes    Start Date: 10/23/2023   This Visit's Progress: 70%   Recent Progress: 40%        Goal: Being Active - get  regular physical activity, working up to at least 150 minutes per week    Start Date: 10/23/2023   This Visit's Progress: 40%   Recent Progress: 10%        Goal: Monitoring - monitor glucose and ketones as directed    Start Date: 10/23/2023   This Visit's Progress: 100%   Recent Progress: 60%        Goal: Taking Medication - patient is consistently taking medications as directed    Start Date: 10/23/2023   This Visit's Progress: 100%   Recent Progress: 90%        Goal: Problem Solving - know how to prevent and manage short-term diabetes complications    Start Date: 10/23/2023   This Visit's Progress: 80%   Recent Progress: 60%          Aleisha Schilling RN/RAJEEV Gamble Diabetes Educator      Time Spent: 60 minutes  Encounter Type: Individual    Any diabetes medication dose changes were made via the CDE Protocol per the patient's primary care provider. A copy of this encounter was shared with the provider.

## 2023-10-23 NOTE — PROGRESS NOTES
Diabetes Self-Management Education & Support    Presents for: Follow-up    Type of Service: In Person Visit    Assessment Type:   ASSESSMENT:  Alejandro is trying hard to learn how to manage his diabetes using his sensors to learn what foods he can eat and how to keep his BG under better control  Wonderful to work with and is learning so much about foods and looking at his sensors to keep his BG from spiking.  His cultural foods are hard to adjust but he is doing this with the portions    Patient's most recent   Lab Results   Component Value Date    A1C 10.8 09/27/2023     is not meeting goal of <7.0    Diabetes knowledge and skills assessment:   Patient is knowledgeable in diabetes management concepts related to: Monitoring, Taking Medication, Problem Solving, Reducing Risks, and Healthy Coping    Continue education with the following diabetes management concepts: Healthy Eating, Being Active, and Taking Medication    Based on learning assessment above, most appropriate setting for further diabetes education would be: Individual setting.      PLAN  Metformin 1000 mg take 1 pill with breakfast and 1 pill with dinner  Finish the Trulicity 0.75 mg dose today  Next week start the Trulicity 1.5 mg dose weekly  Lantus 30 units daily  Switch white bread to grain bread or wheat.   Protein before all meals will benefit  Have for bedtime snack some meat and cheese and crackers  Look for the serving size and the gms of carbs  Get your pre op first week of Dec.    Topics to cover at upcoming visits: Healthy Eating, Being Active, Monitoring, and Taking Medication    Follow-up:     See Care Plan for co-developed, patient-state behavior change goals.  AVS provided for patient today.    Education Materials Provided:  My Plate Planner      SUBJECTIVE/OBJECTIVE:  Presents for: Follow-up  Accompanied by: Self  Diabetes education in the past 24mo: No  Focus of Visit: Monitoring, Healthy Eating, Diabetes Pathophysiology, Assistance w/  "making life changes  Diabetes type: Type 2  Disease course: Improving  How confident are you filling out medical forms by yourself:: Extremely  Transportation concerns: No  Difficulty affording diabetes medication?: No  Difficulty affording diabetes testing supplies?: No  Other concerns:: None  Cultural Influences/Ethnic Background:   or       Diabetes Symptoms & Complications:  Fatigue: No  Neuropathy: No  Polydipsia: No  Polyphagia: No  Polyuria: No  Visual change: No  Slow healing wounds: No  Symptom course: Improving  Weight trend: Decreasing  Autonomic neuropathy: No  CVA: No  Heart disease: No  Nephropathy: No  Peripheral neuropathy: No  Peripheral Vascular Disease: No  Retinopathy: No  Sexual dysfunction: Yes    Patient Problem List and Family Medical History reviewed for relevant medical history, current medical status, and diabetes risk factors.    Vitals:  There were no vitals taken for this visit.  Estimated body mass index is 28.51 kg/m  as calculated from the following:    Height as of 3/17/23: 1.702 m (5' 7\").    Weight as of 9/27/23: 82.6 kg (182 lb).   Last 3 BP:   BP Readings from Last 3 Encounters:   09/27/23 136/59   03/17/23 120/71   01/15/14 118/74       History   Smoking Status    Never   Smokeless Tobacco    Never       Labs:  Lab Results   Component Value Date    A1C 10.8 09/27/2023     Lab Results   Component Value Date     09/27/2023     03/17/2023     Lab Results   Component Value Date    LDL 54 03/17/2023     Direct Measure HDL   Date Value Ref Range Status   03/17/2023 37 (L) >=40 mg/dL Final   ]  GFR Estimate   Date Value Ref Range Status   09/27/2023 >90 >60 mL/min/1.73m2 Final     No results found for: \"GFRESTBLACK\"  Lab Results   Component Value Date    CR 0.84 09/27/2023     No results found for: \"MICROALBUMIN\"    Healthy Eating:  Healthy Eating Assessed Today: Yes  Cultural/Sikhism diet restrictions?: No  Meal planning/habits: Avoiding sweets, Plate " planning method  How many times a week on average do you eat food made away from home (restaurant/take-out)?: 5+  Meals include: Breakfast  Breakfast: 0700 2 cups of cheerios and milk and 1/2 cup of fruit, 1 cup of coffee or bagel and cream cheese, or sausage McMuffin  Lunch: eats 1-3:00 Chx salad with bread and water, 2 chx corn tortilla, or scrambled eggs and corn chowder soup,  Dinner: eats 8:30-9:30 pasta and tomato sauce or chx sandwich and small fries from [employer], or janna salad, and SF jello, or Mac and cheese and SF jello  Snacks: meat,some bars  Beverages: Water, Coffee  Has patient met with a dietitian in the past?: No    Being Active:  Being Active Assessed Today: Yes  Exercise:: Yes  Days per week of moderate to strenuous exercise (like a brisk walk): 4  On average, minutes per day of exercise at this level: 30  How intense was your typical exercise? : Light (like stretching or slow walking)  Exercise Minutes per Week: 120  Barrier to exercise: Time    Monitoring:  Monitoring Assessed Today: Yes  Did patient bring glucose meter to appointment? : Yes  Blood Glucose Meter: Accu-chek  Times checking blood sugar at home (number): 5+  Times checking blood sugar at home (per): Day  Blood glucose trend: Decreasing                  Taking Medications:  Diabetes Medication(s)       Biguanides       metFORMIN (GLUCOPHAGE) 1000 MG tablet    Take 1 tablet (1,000 mg) by mouth 2 times daily (with meals)      Insulin       insulin glargine (LANTUS PEN) 100 UNIT/ML pen    Inject 30 Units Subcutaneous at bedtime      Incretin Mimetic Agents       dulaglutide (TRULICITY) 1.5 MG/0.5ML pen    Inject 1.5 mg Subcutaneous every 7 days            Taking Medication Assessed Today: Yes  Current Treatments: Insulin Injections, Oral Medication (taken by mouth), Non-insulin Injectables  Given by: Patient  Injection/Infusion sites: Abdomen  Problems taking diabetes medications regularly?: No  Diabetes medication side effects?:  No    Problem Solving:  Problem Solving Assessed Today: No  Is the patient at risk for hypoglycemia?: No  Is the patient at risk for DKA?: No  Does patient have severe weather/disaster plan for diabetes management?: Not Needed              Reducing Risks:  Reducing Risks Assessed Today: Yes  Diabetes Risks: Age over 45 years, Family History, Ethnicity, Sedentary Lifestyle  CAD Risks: Diabetes Mellitus, Male sex, Obesity, Sedentary lifestyle  Has dilated eye exam at least once a year?: No  Sees dentist every 6 months?: Yes  Feet checked by healthcare provider in the last year?: No    Healthy Coping:  Healthy Coping Assessed Today: Yes  Emotional response to diabetes: Ready to learn, Acceptance, Confidence diabetes can be controlled  Informal Support system:: Partner  Stage of change: ACTION (Actively working towards change)  Patient Activation Measure Survey Score:      9/27/2023     7:00 AM   FEDERICO Score (Last Two)   FEDERICO Raw Score 40   Activation Score 100   FEDERICO Level 4         Care Plan and Education Provided:  Care Plan: Diabetes   Updates made by Jennifer Schilling RN since 10/23/2023 12:00 AM        Problem: HbA1C Not In Goal         Goal: Establish Regular Follow-Ups with PCP    Start Date: 10/23/2023   This Visit's Progress: 80%        Problem: Diabetes Self-Management Education Needed to Optimize Self-Care Behaviors         Goal: Understand diabetes pathophysiology and disease progression    Start Date: 10/23/2023   This Visit's Progress: 70%   Recent Progress: 50%        Goal: Healthy Eating - follow a healthy eating pattern for diabetes    Start Date: 10/23/2023   This Visit's Progress: 70%   Recent Progress: 40%        Goal: Being Active - get regular physical activity, working up to at least 150 minutes per week    Start Date: 10/23/2023   This Visit's Progress: 40%   Recent Progress: 10%        Goal: Monitoring - monitor glucose and ketones as directed    Start Date: 10/23/2023   This Visit's Progress: 100%    Recent Progress: 60%        Goal: Taking Medication - patient is consistently taking medications as directed    Start Date: 10/23/2023   This Visit's Progress: 100%   Recent Progress: 90%        Goal: Problem Solving - know how to prevent and manage short-term diabetes complications    Start Date: 10/23/2023   This Visit's Progress: 80%   Recent Progress: 60%          Aleisha Schilling RN/RAJEEV Gamble Diabetes Educator      Time Spent: 60 minutes  Encounter Type: Individual    Any diabetes medication dose changes were made via the CDE Protocol per the patient's primary care provider. A copy of this encounter was shared with the provider.

## 2023-10-23 NOTE — PATIENT INSTRUCTIONS
Diabetes Support Resources:  Metformin 1000 mg take 1 pill with breakfast and 1 pill with dinner  Finish the Trulicity 0.75 mg dose today  Next week start the Trulicity 1.5 mg dose weekly  Lantus 30 units daily  Switch white bread to grain bread or wheat.   Protein before all meals will benefit  Have for bedtime snack some meat and cheese and crackers  Look for the serving size and the gms of carbs  Get your pre op first week of Dec.     Bring blood glucose meter and logbook with you to all doctor and follow-up appointments.    Diabetes Education Telephone Visit Follow-up:    We realize your time is valuable and your health is important! We offer a convenient Telephone Visit follow up! It s a quick way to check in for a medication dose adjustment without having to come back to clinic as soon.    Telephone Visits are often covered by insurance. Please check with your insurance plan to see if this type of visit is covered. If not, the cost is less expensive than an office visit:    Up to 10 minutes (Code 63088): $30  11-20 minutes (Code 92345): $59  More than 20 minutes (Code 49894): $85    Talk with your Diabetes Educator if you want to learn more.      [facility]:  For Your Diabetes Education and Nutrition Appointments Call:  [phone]   For Diabetes Education or Nutrition Related Questions:   Phone: [phone]  Send Full [employer] Message   If you need a medication refill please contact your pharmacy. Please allow 3 business days for your refills to be completed.

## 2023-10-24 ENCOUNTER — TELEPHONE (OUTPATIENT)
Dept: FAMILY MEDICINE | Facility: CLINIC | Age: 58
End: 2023-10-24
Payer: COMMERCIAL

## 2023-10-24 NOTE — TELEPHONE ENCOUNTER
Prior Authorization Retail Medication Request    Medication/Dose: Freestyle Luly  ICD code (if different than what is on RX):    Previously Tried and Failed:    Rationale:      Insurance Name:  [facility] [employer]  Insurance ID:  [id number]      Pharmacy Information (if different than what is on RX)  Name:  [facility]  Phone:  [phone]      Meredith Palacios    Pharmacy Technician  [facility]

## 2023-10-26 NOTE — TELEPHONE ENCOUNTER
PRIOR AUTHORIZATION DENIED    Medication: FREESTYLE GRACIE 3 SENSOR [facility]  Insurance Company: [employer] ([facility]) - Phone [phone] Fax [phone]  Denial Date: 10/26/2023  Denial Rational:     Appeal Information:     Patient Notified: No, care team must notify on denials

## 2023-10-26 NOTE — TELEPHONE ENCOUNTER
PA Initiation    Medication: FREESTYLE GRACIE 3 SENSOR [facility]  Insurance Company: [employer] ([facility]) - Phone [phone] Fax [phone]  Pharmacy Filling the Rx: [place], [place] - [address], [address]  Filling Pharmacy Phone: [phone]  Filling Pharmacy Fax: [phone]  Start Date: 10/26/2023

## 2023-10-30 DIAGNOSIS — Z79.4 TYPE 2 DIABETES MELLITUS WITHOUT COMPLICATION, WITH LONG-TERM CURRENT USE OF INSULIN (H): ICD-10-CM

## 2023-10-30 DIAGNOSIS — E11.9 TYPE 2 DIABETES MELLITUS WITHOUT COMPLICATION, WITH LONG-TERM CURRENT USE OF INSULIN (H): ICD-10-CM

## 2023-10-30 RX ORDER — BLOOD-GLUCOSE SENSOR
1 EACH MISCELLANEOUS CONTINUOUS
Qty: 6 EACH | Refills: 11 | Status: SHIPPED | OUTPATIENT
Start: 2023-10-30

## 2023-11-06 ENCOUNTER — TELEPHONE (OUTPATIENT)
Dept: GASTROENTEROLOGY | Facility: CLINIC | Age: 58
End: 2023-11-06
Payer: COMMERCIAL

## 2023-11-06 NOTE — TELEPHONE ENCOUNTER
Caller: Alejandro Flores    Reason for Reschedule/Cancellation (please be detailed, any staff messages or encounters to note?): Provider out      Prior to reschedule please review:  Ordering Provider: Tyler Barbosa PA-C  Sedation per order: MODERATE  Does patient have any ASC Exclusions, please identify?: YES, KAY      Notes on Cancelled Procedure:  Procedure: Lower Endoscopy [Colonoscopy]   Date: 12/27  Location: [facility]; [address] [place] [address]  Surgeon: CHUNG      Rescheduled: Yes  Procedure: Lower Endoscopy [Colonoscopy]   Date: 1/15  Location: [facility]; [address] [place] [address]  Surgeon: AVERY  Sedation Level Scheduled  MAC,  Reason for Sedation Level PER TE DATED 10/5 PT NEEDS MAC AND GI/LUMINAL PROVIDER  Prep/Instructions updated and sent: [employer]       Send In - basket message to Panc - Andrey Pool if EUS  procedure is canceled or rescheduled: [ N/A, YES or NO] N/A

## 2023-11-21 DIAGNOSIS — Z79.4 TYPE 2 DIABETES MELLITUS WITHOUT COMPLICATION, WITH LONG-TERM CURRENT USE OF INSULIN (H): ICD-10-CM

## 2023-11-21 DIAGNOSIS — E11.9 TYPE 2 DIABETES MELLITUS WITHOUT COMPLICATION, WITH LONG-TERM CURRENT USE OF INSULIN (H): ICD-10-CM

## 2023-12-21 DIAGNOSIS — E11.9 TYPE 2 DIABETES MELLITUS WITHOUT COMPLICATION, WITH LONG-TERM CURRENT USE OF INSULIN (H): ICD-10-CM

## 2023-12-21 DIAGNOSIS — Z79.4 TYPE 2 DIABETES MELLITUS WITHOUT COMPLICATION, WITH LONG-TERM CURRENT USE OF INSULIN (H): ICD-10-CM

## 2023-12-29 NOTE — PATIENT INSTRUCTIONS
Preparing for Your Surgery  Getting started  A nurse will call you to review your health history and instructions. They will give you an arrival time based on your scheduled surgery time. Please be ready to share:  Your doctor's clinic name and phone number  Your medical, surgical, and anesthesia history  A list of allergies and sensitivities  A list of medicines, including herbal treatments and over-the-counter drugs  Whether the patient has a legal guardian (ask how to send us the papers in advance)  Please tell us if you're pregnant--or if there's any chance you might be pregnant. Some surgeries may injure a fetus (unborn baby), so they require a pregnancy test. Surgeries that are safe for a fetus don't always need a test, and you can choose whether to have one.   If you have a child who's having surgery, please ask for a copy of Preparing for Your Child's Surgery.    Preparing for surgery  Within 10 to 30 days of surgery: Have a pre-op exam (sometimes called an H&P, or History and Physical). This can be done at a clinic or pre-operative center.  If you're having a , you may not need this exam. Talk to your care team.  At your pre-op exam, talk to your care team about all medicines you take. If you need to stop any medicines before surgery, ask when to start taking them again.  We do this for your safety. Many medicines can make you bleed too much during surgery. Some change how well surgery (anesthesia) drugs work.  Call your insurance company to let them know you're having surgery. (If you don't have insurance, call [phone].)  Call your clinic if there's any change in your health. This includes signs of a cold or flu (sore throat, runny nose, cough, rash, fever). It also includes a scrape or scratch near the surgery site.  If you have questions on the day of surgery, call your hospital or surgery center.  Eating and drinking guidelines  For your safety: Unless your surgeon tells you otherwise,  follow the guidelines below.  Eat and drink as usual until 8 hours before you arrive for surgery. After that, no food or milk.  Drink clear liquids until 2 hours before you arrive. These are liquids you can see through, like water, Gatorade, and Propel Water. They also include plain black coffee and tea (no cream or milk), candy, and breath mints. You can spit out gum when you arrive.  If you drink alcohol: Stop drinking it the night before surgery.  If your care team tells you to take medicine on the morning of surgery, it's okay to take it with a sip of water.  Preventing infection  Shower or bathe the night before and morning of your surgery. Follow the instructions your clinic gave you. (If no instructions, use regular soap.)  Don't shave or clip hair near your surgery site. We'll remove the hair if needed.  Don't smoke or vape the morning of surgery. You may chew nicotine gum up to 2 hours before surgery. A nicotine patch is okay.  Note: Some surgeries require you to completely quit smoking and nicotine. Check with your surgeon.  Your care team will make every effort to keep you safe from infection. We will:  Clean our hands often with soap and water (or an alcohol-based hand rub).  Clean the skin at your surgery site with a special soap that kills germs.  Give you a special gown to keep you warm. (Cold raises the risk of infection.)  Wear special hair covers, masks, gowns and gloves during surgery.  Give antibiotic medicine, if prescribed. Not all surgeries need antibiotics.  What to bring on the day of surgery  Photo ID and insurance card  Copy of your health care directive, if you have one  Glasses and hearing aids (bring cases)  You can't wear contacts during surgery  Inhaler and eye drops, if you use them (tell us about these when you arrive)  CPAP machine or breathing device, if you use them  A few personal items, if spending the night  If you have . . .  A pacemaker, ICD (cardiac defibrillator) or other  implant: Bring the ID card.  An implanted stimulator: Bring the remote control.  A legal guardian: Bring a copy of the certified (court-stamped) guardianship papers.  Please remove any jewelry, including body piercings. Leave jewelry and other valuables at home.  If you're going home the day of surgery  You must have a responsible adult drive you home. They should stay with you overnight as well.  If you don't have someone to stay with you, and you aren't safe to go home alone, we may keep you overnight. Insurance often won't pay for this.  After surgery  If it's hard to control your pain or you need more pain medicine, please call your surgeon's office.  Questions?   If you have any questions for your care team, list them here: _________________________________________________________________________________________________________________________________________________________________________ ____________________________________ ____________________________________ ____________________________________  For informational purposes only. Not to replace the advice of your health care provider. Copyright   2003, 2019 [employer]. All rights reserved. Clinically reviewed by Naya Yusuf MD. [employer] 062161 - REV 12/22.    How to Take Your Medication Before Surgery  - stop trulicity 1 week before. 50% evening insulin

## 2023-12-29 NOTE — PROGRESS NOTES
[facility]  [address] [facility] [address] [facility] [address]  Phone: [phone]  Primary Provider: Long Melchor  Pre-op Performing Provider: LONG MELCHOR      PREOPERATIVE EVALUATION:  Today's date: 1/5/2024    Alejandro is a 58 year old, presenting for the following:  Pre-Op Exam        1/5/2024     7:42 AM   Additional Questions   Roomed by Harper Saleem MA   Accompanied by Self       Surgical Information:  Surgery/Procedure: Colonoscopy  Surgery Location: [facility]  Surgeon: Dr. Lozano  Surgery Date: 01/15/2024  Time of Surgery: 3pm  Where patient plans to recover: At home with family  Fax number for surgical facility: Note does not need to be faxed, will be available electronically in Epic.    Assessment & Plan     The proposed surgical procedure is considered INTERMEDIATE risk.    Preop general physical exam  Cleared without contraindication.    Previous failure for elevated blood sugar. Placed on trulicity, managed with insulin and metformin also. Focused on better lifestyle and is now controlled     Screen for colon cancer  See above. Cleared without contraindication    Type 2 diabetes mellitus without complication, with long-term current use of insulin (H)  Improved from previous  - Adult Eye  Referral; Future  - Hemoglobin A1c; Future  - Basic metabolic panel  (Ca, Cl, CO2, Creat, Gluc, K, Na, BUN); Future  - Continuous Blood Gluc  (FREESTYLE GRACIE 3 READER) GABRIELLE; 1 Device once for 1 dose  - Continuous Blood Gluc Sensor (FREESTYLE GRACIE 3 SENSOR) MISC; 1 Product every 14 days  - Hemoglobin A1c  - Basic metabolic panel  (Ca, Cl, CO2, Creat, Gluc, K, Na, BUN)        - No identified additional risk factors other than previously addressed    Antiplatelet or Anticoagulation Medication Instructions:   - Patient is on no antiplatelet or anticoagulation medications.    Additional Medication Instructions:  Patient is to take all scheduled medications on the day of surgery EXCEPT for  modifications listed below:  [facility] To take insulin 50% dose night before surgery     RECOMMENDATION:  APPROVAL GIVEN to proceed with proposed procedure, without further diagnostic evaluation.            Subjective       HPI related to upcoming procedure:           12/29/2023     9:12 PM   Preop Questions   1. Have you ever had a heart attack or stroke? No   2. Have you ever had surgery on your heart or blood vessels, such as a stent placement, a coronary artery bypass, or surgery on an artery in your head, neck, heart, or legs? No   3. Do you have chest pain with activity? No   4. Do you have a history of  heart failure? No   5. Do you currently have a cold, bronchitis or symptoms of other infection? No   6. Do you have a cough, shortness of breath, or wheezing? No   7. Do you or anyone in your family have previous history of blood clots? YES - mom with dvt, no clotting disorder    8. Do you or does anyone in your family have a serious bleeding problem such as prolonged bleeding following surgeries or cuts? No   9. Have you ever had problems with anemia or been told to take iron pills? No   10. Have you had any abnormal blood loss such as black, tarry or bloody stools? No   11. Have you ever had a blood transfusion? No   12. Are you willing to have a blood transfusion if it is medically needed before, during, or after your surgery? Yes   13. Have you or any of your relatives ever had problems with anesthesia? No   14. Do you have sleep apnea, excessive snoring or daytime drowsiness? YES -    14a. Do you have a CPAP machine? Yes   15. Do you have any artifical heart valves or other implanted medical devices like a pacemaker, defibrillator, or continuous glucose monitor? No   16. Do you have artificial joints? No   17. Are you allergic to latex? No     Health Care Directive:  Patient does not have a Health Care Directive or Living Will: Discussed advance care planning with patient; however,  patient declined at this time.    Preoperative Review of :   reviewed - no record of controlled substances prescribed.          Review of Systems  CONSTITUTIONAL: NEGATIVE for fever, chills, change in weight  INTEGUMENTARY/SKIN: NEGATIVE for worrisome rashes, moles or lesions  EYES: NEGATIVE for vision changes or irritation  ENT/MOUTH: NEGATIVE for ear, mouth and throat problems  RESP: NEGATIVE for significant cough or SOB  CV: NEGATIVE for chest pain, palpitations or peripheral edema  GI: NEGATIVE for nausea, abdominal pain, heartburn, or change in bowel habits  : NEGATIVE for frequency, dysuria, or hematuria  MUSCULOSKELETAL: NEGATIVE for significant arthralgias or myalgia  NEURO: NEGATIVE for weakness, dizziness or paresthesias  ENDOCRINE: NEGATIVE for temperature intolerance, skin/hair changes  HEME: NEGATIVE for bleeding problems  PSYCHIATRIC: NEGATIVE for changes in mood or affect    Patient Active Problem List    Diagnosis Date Noted     Advanced directives, counseling/discussion 09/27/2023     Priority: Medium     Pt was given info on ADV. Harper Saleem MA       Diabetes mellitus, type 2 (H) 09/27/2023     Priority: Medium     KAY (obstructive sleep apnea) 09/27/2023     Priority: Medium     Erectile dysfunction 03/10/2022     Priority: Medium     Plantar fasciitis 07/27/2021     Priority: Medium     Pure hypercholesterolemia 04/23/2018     Priority: Medium        Past Medical History:   Diagnosis Date     Diabetes (H)        No past surgical history on file.  Current Outpatient Medications   Medication Sig Dispense Refill     aspirin 81 MG tablet Take 1 tablet by mouth daily.       B Complex-C (VITAMIN B COMPLEX W/VITAMIN C) TABS tablet Take 1 tablet by mouth daily       bisacodyl (DULCOLAX) 5 MG EC tablet Take 2 tablets at 3 pm the day before your procedure. If your procedure is before 11 am, take 2 additional tablets at 11 pm. If your procedure is after 11 am, take 2 additional tablets at 6 am. For  additional instructions refer to your colonoscopy prep instructions. 4 tablet 0     bisacodyl (DULCOLAX) 5 MG EC tablet Take 2 tablets at 3 pm the day before your procedure. If your procedure is before 11 am, take 2 additional tablets at 11 pm. If your procedure is after 11 am, take 2 additional tablets at 6 am. For additional instructions refer to your colonoscopy prep instructions. 4 tablet 0     bisacodyl (DULCOLAX) 5 MG EC tablet Take 2 tablets at 3 pm the day before your procedure. If your procedure is before 11 am, take 2 additional tablets at 11 pm. If your procedure is after 11 am, take 2 additional tablets at 6 am. For additional instructions refer to your colonoscopy prep instructions. 4 tablet 0     Continuous Blood Gluc Sensor (FREESTYLE GRACIE 3 SENSOR) MISC 1 Box continuous 6 each 11     dulaglutide (TRULICITY) 1.5 MG/0.5ML pen Inject 1.5 mg Subcutaneous every 7 days 6 mL 1     insulin glargine (LANTUS PEN) 100 UNIT/ML pen Inject 30 Units Subcutaneous at bedtime 15 mL 0     metFORMIN (GLUCOPHAGE) 1000 MG tablet Take 1 tablet (1,000 mg) by mouth 2 times daily (with meals) -due for lab visit- 180 tablet 0     Multiple Vitamins-Minerals (MULTI ADULT GUMMIES PO)        polyethylene glycol (GOLYTELY) 236 g suspension The night before the exam at 6 pm drink an 8-ounce glass every 15 minutes until the jug is half empty. If you arrive before 11 AM: Drink the other half of the Golytely jug at 11 PM night before procedure. If you arrive after 11 AM: Drink the other half of the Golytely jug at 6 AM day of procedure. For additional instructions refer to your colonoscopy prep instructions. 4000 mL 0     polyethylene glycol (GOLYTELY) 236 g suspension The night before the exam at 6 pm drink an 8-ounce glass every 15 minutes until the jug is half empty. If you arrive before 11 AM: Drink the other half of the Golytely jug at 11 PM night before procedure. If you arrive after 11 AM: Drink the other half of the Golytely  "jug at 6 AM day of procedure. For additional instructions refer to your colonoscopy prep instructions. 4000 mL 0     polyethylene glycol (GOLYTELY) 236 g suspension The night before the exam at 6 pm drink an 8-ounce glass every 15 minutes until the jug is half empty. If you arrive before 11 AM: Drink the other half of the Golytely jug at 11 PM night before procedure. If you arrive after 11 AM: Drink the other half of the Golytely jug at 6 AM day of procedure. For additional instructions refer to your colonoscopy prep instructions. 4000 mL 0     Respiratory Therapy Supplies (CARETOUCH CPAP & BIPAP HOSE) MISC        rosuvastatin (CRESTOR) 10 MG tablet Take 1 tablet (10 mg) by mouth daily 90 tablet 3     sildenafil (REVATIO) 20 MG tablet Take 20-60 mg by mouth       Turmeric 500 MG CAPS        Vitamin D3 (CHOLECALCIFEROL) 125 MCG (5000 UT) tablet Take by mouth daily         No Known Allergies     Social History     Tobacco Use     Smoking status: Never     Smokeless tobacco: Never   Substance Use Topics     Alcohol use: Not Currently     Family History   Problem Relation Age of Onset     Diabetes Mother      History   Drug Use Unknown         Objective     /72   Pulse 73   Temp 97.9  F (36.6  C) (Tympanic)   Resp 16   Ht 1.702 m (5' 7\")   Wt 79.8 kg (176 lb)   SpO2 98%   BMI 27.57 kg/m      Physical Exam  GENERAL APPEARANCE: healthy, alert and no distress  HENT: ear canals and TM's normal and nose and mouth without ulcers or lesions  RESP: lungs clear to auscultation - no rales, rhonchi or wheezes  CV: regular rate and rhythm, normal S1 S2, no S3 or S4 and no murmur, click or rub   ABDOMEN: soft, nontender, no HSM or masses and bowel sounds normal  NEURO: Normal strength and tone, sensory exam grossly normal, mentation intact and speech normal    Recent Labs   Lab Test 09/27/23  0816 03/17/23  0735    136   POTASSIUM 4.3 4.2   CR 0.84 0.98   A1C 10.8* 7.6*        Diagnostics:  Labs pending at this " time.  Results will be reviewed when available.   No EKG required, no history of coronary heart disease, significant arrhythmia, peripheral arterial disease or other structural heart disease.    Revised Cardiac Risk Index (RCRI):  The patient has the following serious cardiovascular risks for perioperative complications:   - No serious cardiac risks = 0 points     RCRI Interpretation: 0 points: Class I (very low risk - 0.4% complication rate)         Signed Electronically by: LONG MELCHOR PA-C  Copy of this evaluation report is provided to requesting physician.

## 2024-01-03 ENCOUNTER — TELEPHONE (OUTPATIENT)
Dept: GASTROENTEROLOGY | Facility: CLINIC | Age: 59
End: 2024-01-03
Payer: COMMERCIAL

## 2024-01-03 DIAGNOSIS — Z12.11 COLON CANCER SCREENING: Primary | ICD-10-CM

## 2024-01-03 RX ORDER — BISACODYL 5 MG/1
TABLET, DELAYED RELEASE ORAL
Qty: 4 TABLET | Refills: 0 | Status: ON HOLD | OUTPATIENT
Start: 2024-01-03 | End: 2024-01-15

## 2024-01-03 NOTE — TELEPHONE ENCOUNTER
Pre assessment completed for upcoming procedure.    Procedure details:    Arrival time and facility location reviewed.    Pre op exam needed? Yes. Scheduled 1/5/24 with Tyler Barbosa    Designated  policy reviewed. Instructed to have someone stay 24 hours post procedure.     COVID policy reviewed.      Medication review:    Medications reviewed. Please see supporting documentation below. Holding recommendations discussed (if applicable).       Prep for procedure:     Reviewed procedure prep instructions. Patient did not  Golytely and requested to have it sent to pharmacy. Colonoscopy prep prescription was sent to [facility] [address] [address] [facility], [address].       Patient verbalized understanding and had no questions or concerns at this time.        Laly Adhikari RN  Endoscopy Procedure Pre Assessment RN  [phone] option 4

## 2024-01-03 NOTE — TELEPHONE ENCOUNTER
Pre visit planning completed.      Procedure details:    Patient scheduled for Colonoscopy  on 1/15/24.     Arrival time: 14:00. Procedure time 15:00    Pre op exam? Scheduled 1/5/24 with Tyler Barbosa    Facility location: [facility]; [facility] [place] [address] [address]    Sedation type: MAC    Indication for procedure: Screening      Chart review:     Electronic implanted devices? No    Recent diagnosis of diverticulitis within the last 6 weeks? No    Diabetic? Yes. See medication holding recommendations     Diabetic medication HOLDING recommendations: (if applicable)  Oral diabetic medications: Yes:  Metformin (glucophage): HOLD day of procedure.  Diabetic injectables: Yes- Trulicity (Dulaglutide).  Weekly dosing of medication.  Hold 7 days before procedure.  Follow up with managing provider.   Insulin: Yes. Consult with managing provider       Medication review:    Anticoagulants? No    NSAIDS? No NSAID medications per patient's medication list.  RN will verify with pre-assessment call.    Other medication HOLDING recommendations:  N/A      Prep for procedure:     Bowel prep recommendation: Standard Golytely    Due to:  diabetes.     Rescheduled procedure, verify if they have bowel prep.        Adela Chatman RN  Endoscopy Procedure Pre Assessment RN  [phone] option 4

## 2024-01-05 ENCOUNTER — OFFICE VISIT (OUTPATIENT)
Dept: FAMILY MEDICINE | Facility: CLINIC | Age: 59
End: 2024-01-05
Payer: COMMERCIAL

## 2024-01-05 VITALS
SYSTOLIC BLOOD PRESSURE: 125 MMHG | WEIGHT: 176 LBS | BODY MASS INDEX: 27.62 KG/M2 | DIASTOLIC BLOOD PRESSURE: 72 MMHG | HEART RATE: 73 BPM | TEMPERATURE: 97.9 F | OXYGEN SATURATION: 98 % | HEIGHT: 67 IN | RESPIRATION RATE: 16 BRPM

## 2024-01-05 DIAGNOSIS — Z12.11 SCREEN FOR COLON CANCER: ICD-10-CM

## 2024-01-05 DIAGNOSIS — Z79.4 TYPE 2 DIABETES MELLITUS WITHOUT COMPLICATION, WITH LONG-TERM CURRENT USE OF INSULIN (H): Primary | ICD-10-CM

## 2024-01-05 DIAGNOSIS — E11.9 TYPE 2 DIABETES MELLITUS WITHOUT COMPLICATION, WITH LONG-TERM CURRENT USE OF INSULIN (H): Primary | ICD-10-CM

## 2024-01-05 DIAGNOSIS — Z01.818 PREOP GENERAL PHYSICAL EXAM: ICD-10-CM

## 2024-01-05 LAB
ANION GAP SERPL CALCULATED.3IONS-SCNC: 9 MMOL/L (ref 7–15)
BUN SERPL-MCNC: 12.3 MG/DL (ref 8–23)
CALCIUM SERPL-MCNC: 9.1 MG/DL (ref 8.8–10.2)
CHLORIDE SERPL-SCNC: 104 MMOL/L (ref 98–107)
CREAT SERPL-MCNC: 0.9 MG/DL (ref 0.67–1.17)
DEPRECATED HCO3 PLAS-SCNC: 26 MMOL/L (ref 22–29)
EGFRCR SERPLBLD CKD-EPI 2021: >90 ML/MIN/1.73M2
GLUCOSE SERPL-MCNC: 137 MG/DL (ref 70–99)
HBA1C MFR BLD: 8 % (ref 0–5.6)
POTASSIUM SERPL-SCNC: 4.3 MMOL/L (ref 3.4–5.3)
SODIUM SERPL-SCNC: 139 MMOL/L (ref 135–145)

## 2024-01-05 PROCEDURE — 36415 COLL VENOUS BLD VENIPUNCTURE: CPT | Performed by: PHYSICIAN ASSISTANT

## 2024-01-05 PROCEDURE — 80048 BASIC METABOLIC PNL TOTAL CA: CPT | Performed by: PHYSICIAN ASSISTANT

## 2024-01-05 PROCEDURE — 83036 HEMOGLOBIN GLYCOSYLATED A1C: CPT | Performed by: PHYSICIAN ASSISTANT

## 2024-01-05 PROCEDURE — 99214 OFFICE O/P EST MOD 30 MIN: CPT | Performed by: PHYSICIAN ASSISTANT

## 2024-01-05 RX ORDER — BLOOD-GLUCOSE SENSOR
1 EACH MISCELLANEOUS
Qty: 6 EACH | Refills: 4 | Status: SHIPPED | OUTPATIENT
Start: 2024-01-05

## 2024-01-05 RX ORDER — KETOROLAC TROMETHAMINE 30 MG/ML
1 INJECTION, SOLUTION INTRAMUSCULAR; INTRAVENOUS ONCE
Qty: 1 EACH | Refills: 0 | Status: SHIPPED | OUTPATIENT
Start: 2024-01-05 | End: 2024-01-05

## 2024-01-05 ASSESSMENT — PAIN SCALES - GENERAL: PAINLEVEL: NO PAIN (0)

## 2024-01-15 ENCOUNTER — HOSPITAL ENCOUNTER (OUTPATIENT)
Facility: CLINIC | Age: 59
Discharge: HOME | End: 2024-01-15
Attending: INTERNAL MEDICINE | Admitting: INTERNAL MEDICINE
Payer: COMMERCIAL

## 2024-01-15 ENCOUNTER — ANESTHESIA (OUTPATIENT)
Dept: GASTROENTEROLOGY | Facility: CLINIC | Age: 59
End: 2024-01-15
Payer: COMMERCIAL

## 2024-01-15 ENCOUNTER — ANESTHESIA EVENT (OUTPATIENT)
Dept: GASTROENTEROLOGY | Facility: CLINIC | Age: 59
End: 2024-01-15
Payer: COMMERCIAL

## 2024-01-15 VITALS
HEIGHT: 67 IN | DIASTOLIC BLOOD PRESSURE: 75 MMHG | BODY MASS INDEX: 27.47 KG/M2 | SYSTOLIC BLOOD PRESSURE: 136 MMHG | OXYGEN SATURATION: 98 % | RESPIRATION RATE: 15 BRPM | WEIGHT: 175 LBS | HEART RATE: 70 BPM

## 2024-01-15 LAB — COLONOSCOPY: NORMAL

## 2024-01-15 PROCEDURE — 250N000009 HC RX 250: Performed by: ANESTHESIOLOGY

## 2024-01-15 PROCEDURE — 370N000017 HC ANESTHESIA TECHNICAL FEE, PER MIN: Performed by: INTERNAL MEDICINE

## 2024-01-15 PROCEDURE — 45385 COLONOSCOPY W/LESION REMOVAL: CPT | Performed by: INTERNAL MEDICINE

## 2024-01-15 PROCEDURE — 88305 TISSUE EXAM BY PATHOLOGIST: CPT | Mod: TC | Performed by: INTERNAL MEDICINE

## 2024-01-15 PROCEDURE — 88305 TISSUE EXAM BY PATHOLOGIST: CPT | Mod: 26 | Performed by: PATHOLOGY

## 2024-01-15 PROCEDURE — 999N000010 HC STATISTIC ANES STAT CODE-CRNA PER MINUTE: Performed by: INTERNAL MEDICINE

## 2024-01-15 PROCEDURE — 250N000011 HC RX IP 250 OP 636: Performed by: ANESTHESIOLOGY

## 2024-01-15 PROCEDURE — 258N000003 HC RX IP 258 OP 636: Performed by: ANESTHESIOLOGY

## 2024-01-15 PROCEDURE — 250N000013 HC RX MED GY IP 250 OP 250 PS 637: Performed by: INTERNAL MEDICINE

## 2024-01-15 RX ORDER — PROPOFOL 10 MG/ML
INJECTION, EMULSION INTRAVENOUS CONTINUOUS PRN
Status: DISCONTINUED | OUTPATIENT
Start: 2024-01-15 | End: 2024-01-15

## 2024-01-15 RX ORDER — LIDOCAINE HYDROCHLORIDE 20 MG/ML
INJECTION, SOLUTION INFILTRATION; PERINEURAL PRN
Status: DISCONTINUED | OUTPATIENT
Start: 2024-01-15 | End: 2024-01-15

## 2024-01-15 RX ORDER — SODIUM CHLORIDE, SODIUM LACTATE, POTASSIUM CHLORIDE, CALCIUM CHLORIDE 600; 310; 30; 20 MG/100ML; MG/100ML; MG/100ML; MG/100ML
INJECTION, SOLUTION INTRAVENOUS CONTINUOUS PRN
Status: DISCONTINUED | OUTPATIENT
Start: 2024-01-15 | End: 2024-01-15

## 2024-01-15 RX ORDER — SIMETHICONE 40MG/0.6ML
SUSPENSION, DROPS(FINAL DOSAGE FORM)(ML) ORAL PRN
Status: DISCONTINUED | OUTPATIENT
Start: 2024-01-15 | End: 2024-01-15 | Stop reason: HOSPADM

## 2024-01-15 RX ADMIN — PROPOFOL 300 MCG/KG/MIN: 10 INJECTION, EMULSION INTRAVENOUS at 15:13

## 2024-01-15 RX ADMIN — LIDOCAINE HYDROCHLORIDE 40 MG: 20 INJECTION, SOLUTION INFILTRATION; PERINEURAL at 15:13

## 2024-01-15 RX ADMIN — SODIUM CHLORIDE, POTASSIUM CHLORIDE, SODIUM LACTATE AND CALCIUM CHLORIDE: 600; 310; 30; 20 INJECTION, SOLUTION INTRAVENOUS at 15:10

## 2024-01-15 ASSESSMENT — ENCOUNTER SYMPTOMS
DYSRHYTHMIAS: 0
SEIZURES: 0

## 2024-01-15 ASSESSMENT — ACTIVITIES OF DAILY LIVING (ADL): ADLS_ACUITY_SCORE: 35

## 2024-01-15 NOTE — ANESTHESIA CARE TRANSFER NOTE
Patient: Alejandro Flores    Procedure: Procedure(s):  COLONOSCOPY, FLEXIBLE, WITH LESION REMOVAL USING SNARE       Diagnosis: Screening for colon cancer [Z12.11]  Diagnosis Additional Information: No value filed.    Anesthesia Type:   MAC     Note:    Oropharynx: oropharynx clear of all foreign objects and spontaneously breathing  Level of Consciousness: drowsy  Oxygen Supplementation: room air    Independent Airway: airway patency satisfactory and stable  Dentition: dentition unchanged  Vital Signs Stable: post-procedure vital signs reviewed and stable  Report to RN Given: handoff report given  Patient transferred to: Phase II  Comments: At end of procedure, spontaneous respirations, patient alert to voice, able to follow commands. Patient breathing room air at room air to PACU. SpO2, NiBP, and EKG monitors and alarms on and functioning, James Hugger warmer connected to patient gown, report on patient's clinical status given to PACU RN, RN questions answered.      Handoff Report: Identifed the Patient, Identified the Reponsible Provider, Reviewed the pertinent medical history, Discussed the surgical course, Reviewed Intra-OP anesthesia mangement and issues during anesthesia, Set expectations for post-procedure period and Allowed opportunity for questions and acknowledgement of understanding      Vitals:  Vitals Value Taken Time   BP     Temp     Pulse 65 01/15/24 1531   Resp 9 01/15/24 1531   SpO2 97 % 01/15/24 1531   Vitals shown include unfiled device data.    Electronically Signed By: Diamond Browning  January 15, 2024  3:33 PM

## 2024-01-15 NOTE — ANESTHESIA POSTPROCEDURE EVALUATION
Patient: Alejandro Flores    Procedure: Procedure(s):  COLONOSCOPY, FLEXIBLE, WITH LESION REMOVAL USING SNARE       Anesthesia Type:  MAC    Note:  Disposition: Outpatient   Postop Pain Control: Uneventful            Sign Out: Well controlled pain   PONV: No   Neuro/Psych: Uneventful            Sign Out: Acceptable/Baseline neuro status   Airway/Respiratory: Uneventful            Sign Out: Acceptable/Baseline resp. status   CV/Hemodynamics: Uneventful            Sign Out: Acceptable CV status; No obvious hypovolemia; No obvious fluid overload   Other NRE: NONE   DID A NON-ROUTINE EVENT OCCUR? No           Last vitals:  Vitals Value Taken Time   /75 01/15/24 1540   Temp     Pulse 68 01/15/24 1545   Resp 22 01/15/24 1545   SpO2 98 % 01/15/24 1545   Vitals shown include unfiled device data.    Electronically Signed By: Junaid Vanessa DO  January 15, 2024  4:52 PM

## 2024-01-15 NOTE — ANESTHESIA PREPROCEDURE EVALUATION
"Anesthesia Pre-Procedure Evaluation    Patient: Alejandro Flores   MRN: [id number] : 1965        Procedure : Procedure(s):  Colonoscopy          Past Medical History:   Diagnosis Date    Diabetes (H)       No past surgical history on file.   No Known Allergies   Social History     Tobacco Use    Smoking status: Never    Smokeless tobacco: Never   Substance Use Topics    Alcohol use: Not Currently      Wt Readings from Last 1 Encounters:   24 79.8 kg (176 lb)        Anesthesia Evaluation   Pt has had prior anesthetic.     No history of anesthetic complications       ROS/MED HX  ENT/Pulmonary:     (+) sleep apnea,                                    (-) asthma   Neurologic:    (-) no seizures, no CVA and migraines   Cardiovascular:     (+) Dyslipidemia - -   -  - -                                   (-) hypertension, CHF, arrhythmias and irregular heartbeat/palpitations   METS/Exercise Tolerance:     Hematologic:       Musculoskeletal:       GI/Hepatic:    (-) GERD and liver disease   Renal/Genitourinary:    (-) renal disease   Endo:     (+)  type II DM,   Using insulin, - not using insulin pump.             (-) thyroid disease   Psychiatric/Substance Use:       Infectious Disease:       Malignancy:       Other:            Physical Exam    Airway        Mallampati: II   TM distance: > 3 FB   Neck ROM: full   Mouth opening: > 3 cm    Respiratory Devices and Support         Dental  no notable dental history         Cardiovascular   cardiovascular exam normal          Pulmonary   pulmonary exam normal        breath sounds clear to auscultation           OUTSIDE LABS:  CBC: No results found for: \"WBC\", \"HGB\", \"HCT\", \"PLT\"  BMP:   Lab Results   Component Value Date     2024     2023    POTASSIUM 4.3 2024    POTASSIUM 4.3 2023    CHLORIDE 104 2024    CHLORIDE 100 2023    CO2 26 2024    CO2 25 2023    BUN 12.3 2024    BUN 13.6 2023    CR " "0.90 01/05/2024    CR 0.84 09/27/2023     (H) 01/05/2024     (H) 09/27/2023     COAGS: No results found for: \"PTT\", \"INR\", \"FIBR\"  POC: No results found for: \"BGM\", \"HCG\", \"HCGS\"  HEPATIC: No results found for: \"ALBUMIN\", \"PROTTOTAL\", \"ALT\", \"AST\", \"GGT\", \"ALKPHOS\", \"BILITOTAL\", \"BILIDIRECT\", \"STEPHANIE\"  OTHER:   Lab Results   Component Value Date    A1C 8.0 (H) 01/05/2024    BRIANDA 9.1 01/05/2024       Anesthesia Plan    ASA Status:  2    NPO Status:  NPO Appropriate    Anesthesia Type: MAC.     - Reason for MAC: immobility needed, straight local not clinically adequate              Consents    Anesthesia Plan(s) and associated risks, benefits, and realistic alternatives discussed. Questions answered and patient/representative(s) expressed understanding.     - Discussed:     - Discussed with:  Patient      - Extended Intubation/Ventilatory Support Discussed: No.      - Patient is DNR/DNI Status: No     Use of blood products discussed: Yes.     - Discussed with: Patient.     - Consented: consented to blood products            Reason for refusal: other.     Postoperative Care    Pain management: IV analgesics, Oral pain medications, Multi-modal analgesia.   PONV prophylaxis: Ondansetron (or other 5HT-3), Dexamethasone or Solumedrol, Background Propofol Infusion     Comments:               Junaid Vanessa, DO    I have reviewed the pertinent notes and labs in the chart from the past 30 days and (re)examined the patient.  Any updates or changes from those notes are reflected in this note.             # Drug Induced Platelet Defect: home medication list includes an antiplatelet medication  # DMII: A1C = 8.0 % (Ref range: 0.0 - 5.6 %) within past 6 months  # Overweight: Estimated body mass index is 27.57 kg/m  as calculated from the following:    Height as of 1/5/24: 1.702 m (5' 7\").    Weight as of 1/5/24: 79.8 kg (176 lb).      "

## 2024-01-16 ENCOUNTER — TELEPHONE (OUTPATIENT)
Dept: FAMILY MEDICINE | Facility: CLINIC | Age: 59
End: 2024-01-16

## 2024-01-16 NOTE — TELEPHONE ENCOUNTER
Prior Authorization Retail Medication Request    Medication/Dose: Trulicity  Diagnosis and ICD code (if different than what is on RX):    New/renewal/insurance change PA/secondary ins. PA:  Previously Tried and Failed:    Rationale:      Insurance   Primary: [employer] PMAP  Insurance ID:  [id number]    Secondary (if applicable):  Insurance ID:      Pharmacy Information (if different than what is on RX)  Name:  [facility]  Phone:  [phone]  Fax:[phone]    Meredith Palacios    Pharmacy Technician  [facility]

## 2024-01-17 LAB
PATH REPORT.COMMENTS IMP SPEC: NORMAL
PATH REPORT.COMMENTS IMP SPEC: NORMAL
PATH REPORT.FINAL DX SPEC: NORMAL
PATH REPORT.GROSS SPEC: NORMAL
PATH REPORT.MICROSCOPIC SPEC OTHER STN: NORMAL
PATH REPORT.RELEVANT HX SPEC: NORMAL
PHOTO IMAGE: NORMAL

## 2024-01-24 NOTE — TELEPHONE ENCOUNTER
Retail Pharmacy Prior Authorization Team   Phone: [phone]    PA Initiation    Medication: TRULICITY 1.5 MG/0.5ML SC SOPN  Insurance Company: [employer] - Phone [phone] Fax [phone]  Pharmacy Filling the Rx: [place], [place] - [address], [address]  Filling Pharmacy Phone: [phone]  Filling Pharmacy Fax:    Start Date: 1/24/2024

## 2024-01-25 NOTE — TELEPHONE ENCOUNTER
Prior Authorization Approval    Medication: TRULICITY 1.5 MG/0.5ML SC SOPN  Authorization Effective Date: 1/25/2024  Authorization Expiration Date: 1/25/2025  Approved Dose/Quantity:   Reference #:     Insurance Company: [employer] - Phone [phone] Fax [phone]  Expected CoPay: $    CoPay Card Available:      Financial Assistance Needed:   Which Pharmacy is filling the prescription: [address], [address]  Pharmacy Notified: Yes  Patient Notified: **Instructed pharmacy to notify patient when script is ready to /ship.**

## 2024-01-29 NOTE — TELEPHONE ENCOUNTER
Prior Authorization Approval    Medication: TRULICITY 1.5 MG/0.5ML SC SOPN  Authorization Effective Date: 1/25/2024  Authorization Expiration Date: 1/25/2025  Approved Dose/Quantity:   Reference #:     Insurance Company: [employer] - Phone [phone] Fax [phone]  Expected CoPay: $    CoPay Card Available:      Financial Assistance Needed:   Which Pharmacy is filling the prescription: [address], [address]  Pharmacy Notified: No  Patient Notified: **Instructed pharmacy to notify patient when script is ready to /ship.**

## 2024-02-05 ENCOUNTER — OFFICE VISIT (OUTPATIENT)
Dept: OPTOMETRY | Facility: CLINIC | Age: 59
End: 2024-02-05
Attending: PHYSICIAN ASSISTANT
Payer: COMMERCIAL

## 2024-02-05 DIAGNOSIS — H52.223 REGULAR ASTIGMATISM OF BOTH EYES: ICD-10-CM

## 2024-02-05 DIAGNOSIS — H52.4 PRESBYOPIA: ICD-10-CM

## 2024-02-05 DIAGNOSIS — Z79.4 TYPE 2 DIABETES MELLITUS WITHOUT COMPLICATION, WITH LONG-TERM CURRENT USE OF INSULIN (H): Primary | ICD-10-CM

## 2024-02-05 DIAGNOSIS — E11.9 TYPE 2 DIABETES MELLITUS WITHOUT COMPLICATION, WITH LONG-TERM CURRENT USE OF INSULIN (H): Primary | ICD-10-CM

## 2024-02-05 PROCEDURE — 92015 DETERMINE REFRACTIVE STATE: CPT | Performed by: OPTOMETRIST

## 2024-02-05 PROCEDURE — 92004 COMPRE OPH EXAM NEW PT 1/>: CPT | Performed by: OPTOMETRIST

## 2024-02-05 ASSESSMENT — REFRACTION_MANIFEST
OD_CYLINDER: +0.75
OD_ADD: +2.50
OD_CYLINDER: +0.50
OD_AXIS: 099
OD_SPHERE: -0.25
OS_AXIS: 085
OS_AXIS: 084
OS_SPHERE: -0.75
METHOD_AUTOREFRACTION: 1
OD_AXIS: 115
OS_ADD: +2.50
OD_SPHERE: -0.50
OS_CYLINDER: +0.75
OS_CYLINDER: +0.75
OS_SPHERE: -0.75

## 2024-02-05 ASSESSMENT — KERATOMETRY
OD_AXISANGLE_DEGREES: 79
OS_AXISANGLE_DEGREES: 90
OD_K1POWER_DIOPTERS: 44.25
OD_K2POWER_DIOPTERS: 45.75
OS_K2POWER_DIOPTERS: 45.25
OS_AXISANGLE2_DEGREES: 180
OD_AXISANGLE2_DEGREES: 169
OS_K1POWER_DIOPTERS: 44.00

## 2024-02-05 ASSESSMENT — VISUAL ACUITY
METHOD: SNELLEN - LINEAR
OD_CC: 20/25
OD_SC+: +2
OS_CC: 20/25
OD_SC: 20/25
OS_SC: 20/20
CORRECTION_TYPE: GLASSES
OS_SC+: -1

## 2024-02-05 ASSESSMENT — TONOMETRY
OS_IOP_MMHG: 15
IOP_METHOD: APPLANATION
OD_IOP_MMHG: 15

## 2024-02-05 ASSESSMENT — CONF VISUAL FIELD
OD_NORMAL: 1
OS_NORMAL: 1
OS_SUPERIOR_TEMPORAL_RESTRICTION: 0
OD_INFERIOR_NASAL_RESTRICTION: 0
OD_INFERIOR_TEMPORAL_RESTRICTION: 0
OD_SUPERIOR_NASAL_RESTRICTION: 0
OS_INFERIOR_TEMPORAL_RESTRICTION: 0
OS_INFERIOR_NASAL_RESTRICTION: 0
OD_SUPERIOR_TEMPORAL_RESTRICTION: 0
OS_SUPERIOR_NASAL_RESTRICTION: 0

## 2024-02-05 ASSESSMENT — SLIT LAMP EXAM - LIDS
COMMENTS: 2+ MEIBOMIAN GLAND DYSFUNCTION
COMMENTS: 2+ MEIBOMIAN GLAND DYSFUNCTION

## 2024-02-05 ASSESSMENT — CUP TO DISC RATIO
OS_RATIO: 0.3
OD_RATIO: 0.3

## 2024-02-05 ASSESSMENT — REFRACTION_WEARINGRX
SPECS_TYPE: READERS
OS_SPHERE: +1.50
OD_SPHERE: +1.50

## 2024-02-05 NOTE — PATIENT INSTRUCTIONS
Over the counter reading glasses as needed +1.50  Keep blood sugar under good control  Return in 1 year for diabetic eye exam      Blood sugar and blood pressure control are very important in the prevention of ocular complications from diabetes.       Ni Cope, OD  [phone]

## 2024-02-05 NOTE — PROGRESS NOTES
Chief Complaint   Patient presents with    Diabetic Eye Exam        Chief Complaint(s) and History of Present Illness(es)       Diabetic Eye Exam              Diabetes Type: Type 2, on insulin and taking oral medications    Duration: 20 years    Blood Sugars: is controlled                   Lab Results   Component Value Date    A1C 8.0 01/05/2024    A1C 10.8 09/27/2023    A1C 7.6 03/17/2023            Last Eye Exam: 2022  Dilated Previously: Yes    What are you currently using to see?  Readers +1.50    Distance Vision Acuity: Satisfied with vision    Near Vision Acuity: Satisfied with vision while reading and using computer with readers    Eye Comfort: good  Do you use eye drops? 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       OBJECTIVE: See Ophthalmology exam    ASSESSMENT:    ICD-10-CM    1. Type 2 diabetes mellitus without complication, with long-term current use of insulin (H)  E11.9 Adult Eye  Referral    Z79.4       2. Regular astigmatism of both eyes  H52.223       3. Presbyopia  H52.4           PLAN:    Alejandro Flores aware  eye exam results will be sent to Tyler Barbosa.  Patient Instructions   Over the counter reading glasses as needed +1.50  Keep blood sugar under good control  Return in 1 year for diabetic eye exam      Blood sugar and blood pressure control are very important in the prevention of ocular complications from diabetes.       Ni Cope, OD  [phone]

## 2024-02-05 NOTE — LETTER
2/5/2024         RE: Alejandro Flores  [address] [address]        Dear Colleague,    Thank you for referring your patient, Alejandro Flores, to the [facility]. No diabetic retinopathy was found at eye exam. Please see a copy of my visit note below.    Chief Complaint   Patient presents with     Diabetic Eye Exam        Chief Complaint(s) and History of Present Illness(es)       Diabetic Eye Exam              Diabetes Type: Type 2, on insulin and taking oral medications    Duration: 20 years    Blood Sugars: is controlled                   Lab Results   Component Value Date    A1C 8.0 01/05/2024    A1C 10.8 09/27/2023    A1C 7.6 03/17/2023            Last Eye Exam: 2022  Dilated Previously: Yes    What are you currently using to see?  Readers +1.50    Distance Vision Acuity: Satisfied with vision    Near Vision Acuity: Satisfied with vision while reading and using computer with readers    Eye Comfort: good  Do you use eye drops? 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       OBJECTIVE: See Ophthalmology exam    ASSESSMENT:    ICD-10-CM    1. Type 2 diabetes mellitus without complication, with long-term current use of insulin (H)  E11.9 Adult Eye  Referral    Z79.4       2. Regular astigmatism of both eyes  H52.223       3. Presbyopia  H52.4           PLAN:    Alejandro Flores aware  eye exam results will be sent to Tyler Barbosa.  Patient Instructions   Over the counter reading glasses as needed +1.50  Keep blood sugar under good control  Return in 1 year for diabetic eye exam      Blood sugar and blood pressure control are very important in the prevention of ocular complications from diabetes.       Ni Cope, OD  [phone]                      Again, thank you for allowing me to participate in the care of your patient.         Sincerely,        Ni Cope, OD

## 2024-02-19 DIAGNOSIS — Z79.4 TYPE 2 DIABETES MELLITUS WITHOUT COMPLICATION, WITH LONG-TERM CURRENT USE OF INSULIN (H): ICD-10-CM

## 2024-02-19 DIAGNOSIS — E11.9 TYPE 2 DIABETES MELLITUS WITHOUT COMPLICATION, WITH LONG-TERM CURRENT USE OF INSULIN (H): ICD-10-CM

## 2024-03-04 ENCOUNTER — OFFICE VISIT (OUTPATIENT)
Dept: URGENT CARE | Facility: URGENT CARE | Age: 59
End: 2024-03-04
Payer: COMMERCIAL

## 2024-03-04 VITALS
TEMPERATURE: 97 F | HEART RATE: 74 BPM | OXYGEN SATURATION: 99 % | BODY MASS INDEX: 28.82 KG/M2 | WEIGHT: 184 LBS | DIASTOLIC BLOOD PRESSURE: 77 MMHG | RESPIRATION RATE: 16 BRPM | SYSTOLIC BLOOD PRESSURE: 132 MMHG

## 2024-03-04 DIAGNOSIS — M54.50 ACUTE RIGHT-SIDED LOW BACK PAIN WITHOUT SCIATICA: Primary | ICD-10-CM

## 2024-03-04 PROCEDURE — 99213 OFFICE O/P EST LOW 20 MIN: CPT | Performed by: FAMILY MEDICINE

## 2024-03-04 RX ORDER — CYCLOBENZAPRINE HCL 10 MG
10 TABLET ORAL 3 TIMES DAILY PRN
Qty: 30 TABLET | Refills: 0 | Status: SHIPPED | OUTPATIENT
Start: 2024-03-04

## 2024-03-04 ASSESSMENT — PAIN SCALES - GENERAL: PAINLEVEL: MODERATE PAIN (5)

## 2024-03-04 NOTE — PATIENT INSTRUCTIONS
Take prescribed medication as directed.    Stretch.    Try heat.    Try to reduce work load somewhat.    Return if not  improving.

## 2024-03-04 NOTE — PROGRESS NOTES
(M54.50) Acute right-sided low back pain without sciatica  (primary encounter diagnosis)  Comment:     He seems to have muscular tenderness.  History and exam not consistent with radiculopathy.  No other obvious complications.    Plan: cyclobenzaprine (FLEXERIL) 10 MG tablet        Try muscle relaxer.  Try heat and stretching.  Advised that he consider cutting back on his work hours a bit.  Follow-up if not improving or if symptoms worsen.      CHIEF COMPLAINT    Low back pain.      HISTORY    He has developed low back pain over the last 10 days.  It seems to be more to the right side of the lower lumbar region.  Does not radiate into the lower extremities.    He is not having weakness or numbness.  No fever.  No abdominal pain.    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      Patient Active Problem List   Diagnosis    Pure hypercholesterolemia    Plantar fasciitis    Erectile dysfunction    Advanced directives, counseling/discussion    Diabetes mellitus, type 2 (H)    KAY (obstructive sleep apnea)       Current Outpatient Medications   Medication Sig Dispense Refill    aspirin 81 MG tablet Take 1 tablet by mouth daily.      B Complex-C (VITAMIN B COMPLEX W/VITAMIN C) TABS tablet Take 1 tablet by mouth daily      Continuous Blood Gluc Sensor (FREESTYLE GRACIE 3 SENSOR) MISC 1 Product every 14 days 6 each 4    Continuous Blood Gluc Sensor (FREESTYLE GRACIE 3 SENSOR) MISC 1 Box continuous 6 each 11    cyclobenzaprine (FLEXERIL) 10 MG tablet Take 1 tablet (10 mg) by mouth 3 times daily as needed for muscle spasms 30 tablet 0    dulaglutide (TRULICITY) 1.5 MG/0.5ML pen Inject 1.5 mg Subcutaneous every 7 days 6 mL 1    insulin glargine (LANTUS PEN) 100 UNIT/ML pen Inject 30 Units Subcutaneous at bedtime 15 mL 0    metFORMIN (GLUCOPHAGE) 1000 MG tablet Take 1 tablet (1,000 mg) by mouth 2 times daily (with meals) -due for lab visit- 180 tablet 0    Multiple Vitamins-Minerals (MULTI ADULT GUMMIES  PO)       Respiratory Therapy Supplies (CARETOUCH CPAP & BIPAP HOSE) MISC       rosuvastatin (CRESTOR) 10 MG tablet Take 1 tablet (10 mg) by mouth daily 90 tablet 3    sildenafil (REVATIO) 20 MG tablet Take 20-60 mg by mouth      Turmeric 500 MG CAPS       Vitamin D3 (CHOLECALCIFEROL) 125 MCG (5000 UT) tablet Take by mouth daily         REVIEW OF SYSTEMS    No fever or chills.  No cough or SOB.  No chest pain.  No abdominal pain.      EXAM  /77   Pulse 74   Temp 97  F (36.1  C) (Tympanic)   Resp 16   Wt 83.5 kg (184 lb)   SpO2 99%   BMI 28.82 kg/m      Pleasant man, NAD.  Nonlabored breathing.  Abdomen nondistended, nontender.  Low back exam shows some tightness of the paralumbar muscles.  Slightly diminished spinal range of motion.  Negative SLRs.

## 2024-03-06 ENCOUNTER — PATIENT OUTREACH (OUTPATIENT)
Dept: CARE COORDINATION | Facility: CLINIC | Age: 59
End: 2024-03-06
Payer: COMMERCIAL

## 2024-03-26 ENCOUNTER — OFFICE VISIT (OUTPATIENT)
Dept: FAMILY MEDICINE | Facility: CLINIC | Age: 59
End: 2024-03-26
Payer: COMMERCIAL

## 2024-03-26 VITALS
OXYGEN SATURATION: 96 % | SYSTOLIC BLOOD PRESSURE: 138 MMHG | WEIGHT: 178 LBS | TEMPERATURE: 98.1 F | RESPIRATION RATE: 15 BRPM | HEIGHT: 66 IN | DIASTOLIC BLOOD PRESSURE: 78 MMHG | BODY MASS INDEX: 28.61 KG/M2 | HEART RATE: 75 BPM

## 2024-03-26 DIAGNOSIS — Z12.5 SCREENING FOR PROSTATE CANCER: ICD-10-CM

## 2024-03-26 DIAGNOSIS — Z79.4 TYPE 2 DIABETES MELLITUS WITHOUT COMPLICATION, WITH LONG-TERM CURRENT USE OF INSULIN (H): ICD-10-CM

## 2024-03-26 DIAGNOSIS — Z00.00 ROUTINE GENERAL MEDICAL EXAMINATION AT A HEALTH CARE FACILITY: Primary | ICD-10-CM

## 2024-03-26 DIAGNOSIS — G47.33 OSA (OBSTRUCTIVE SLEEP APNEA): ICD-10-CM

## 2024-03-26 DIAGNOSIS — E11.9 TYPE 2 DIABETES MELLITUS WITHOUT COMPLICATION, WITH LONG-TERM CURRENT USE OF INSULIN (H): ICD-10-CM

## 2024-03-26 DIAGNOSIS — E78.5 HYPERLIPIDEMIA LDL GOAL <100: ICD-10-CM

## 2024-03-26 LAB
CHOLEST SERPL-MCNC: 129 MG/DL
FASTING STATUS PATIENT QL REPORTED: YES
HBA1C MFR BLD: 7.1 % (ref 0–5.6)
HDLC SERPL-MCNC: 39 MG/DL
LDLC SERPL CALC-MCNC: 76 MG/DL
NONHDLC SERPL-MCNC: 90 MG/DL
PSA SERPL DL<=0.01 NG/ML-MCNC: 3.03 NG/ML (ref 0–4.5)
TRIGL SERPL-MCNC: 71 MG/DL
VIT B12 SERPL-MCNC: 344 PG/ML (ref 232–1245)

## 2024-03-26 PROCEDURE — 80061 LIPID PANEL: CPT | Performed by: FAMILY MEDICINE

## 2024-03-26 PROCEDURE — 99396 PREV VISIT EST AGE 40-64: CPT | Performed by: FAMILY MEDICINE

## 2024-03-26 PROCEDURE — 36415 COLL VENOUS BLD VENIPUNCTURE: CPT | Performed by: FAMILY MEDICINE

## 2024-03-26 PROCEDURE — 83036 HEMOGLOBIN GLYCOSYLATED A1C: CPT | Performed by: FAMILY MEDICINE

## 2024-03-26 PROCEDURE — 99213 OFFICE O/P EST LOW 20 MIN: CPT | Mod: 25 | Performed by: FAMILY MEDICINE

## 2024-03-26 PROCEDURE — 99207 PR FOOT EXAM NO CHARGE: CPT | Mod: 25 | Performed by: FAMILY MEDICINE

## 2024-03-26 PROCEDURE — G0103 PSA SCREENING: HCPCS | Performed by: FAMILY MEDICINE

## 2024-03-26 PROCEDURE — 82607 VITAMIN B-12: CPT | Performed by: FAMILY MEDICINE

## 2024-03-26 SDOH — HEALTH STABILITY: PHYSICAL HEALTH: ON AVERAGE, HOW MANY DAYS PER WEEK DO YOU ENGAGE IN MODERATE TO STRENUOUS EXERCISE (LIKE A BRISK WALK)?: 3 DAYS

## 2024-03-26 ASSESSMENT — PAIN SCALES - GENERAL: PAINLEVEL: NO PAIN (0)

## 2024-03-26 ASSESSMENT — SOCIAL DETERMINANTS OF HEALTH (SDOH): HOW OFTEN DO YOU GET TOGETHER WITH FRIENDS OR RELATIVES?: ONCE A WEEK

## 2024-03-26 NOTE — PROGRESS NOTES
"Preventive Care Visit  [facility]  Belinda Santizo MD, Family Medicine  Mar 26, 2024      Assessment & Plan     Routine general medical examination at a health care facility      Type 2 diabetes mellitus without complication, with long-term current use of insulin (H)  On meds and doing well  On aspirin and statin   Eye exam UTD  - Lipid panel reflex to direct LDL Non-fasting; Future  - HEMOGLOBIN A1C; Future  - dulaglutide (TRULICITY) 1.5 MG/0.5ML pen; Inject 1.5 mg Subcutaneous every 7 days  - insulin glargine (LANTUS PEN) 100 UNIT/ML pen; Inject 30 Units Subcutaneous at bedtime  - **Vitamin B12 FUTURE 2mo; Future  - Lipid panel reflex to direct LDL Non-fasting  - HEMOGLOBIN A1C  - **Vitamin B12 FUTURE 2mo    KAY (obstructive sleep apnea)  Struggling with it but working on it    Hyperlipidemia LDL goal <100  At goal on meds    Screening for prostate cancer    - PSA, screen; Future  - PSA, screen    Patient has been advised of split billing requirements and indicates understanding: Yes          BMI  Estimated body mass index is 29.17 kg/m  as calculated from the following:    Height as of this encounter: 1.664 m (5' 5.5\").    Weight as of this encounter: 80.7 kg (178 lb).   Weight management plan: Discussed healthy diet and exercise guidelines    Counseling  Appropriate preventive services were discussed with this patient, including applicable screening as appropriate for fall prevention, nutrition, physical activity, Tobacco-use cessation, weight loss and cognition.  Checklist reviewing preventive services available has been given to the patient.  Reviewed patient's diet, addressing concerns and/or questions.   He is at risk for lack of exercise and has been provided with information to increase physical activity for the benefit of his well-being.           Rick Allen is a 58 year old, presenting for the following:  Physical and Diabetes        3/26/2024     7:59 AM "   Additional Questions   Roomed by teo        Health Care Directive  Patient does not have a Health Care Directive or Living Will: Discussed advance care planning with patient; information given to patient to review.    HPI  Here for annual physical  Needs diabetes check  No other concerns    Diabetes Follow-up    How often are you checking your blood sugar? Continuous glucose monitor  What time of day are you checking your blood sugars (select all that apply)?  Before and after meals  Have you had any blood sugars above 200?  Yes after dinner   Have you had any blood sugars below 70?  No  What symptoms do you notice when your blood sugar is low?  Shaky  What concerns do you have today about your diabetes? None   Do you have any of these symptoms? (Select all that apply)  Numbness in feet      BP Readings from Last 2 Encounters:   03/26/24 138/78   03/04/24 132/77     Hemoglobin A1C (%)   Date Value   01/05/2024 8.0 (H)   09/27/2023 10.8 (H)     LDL Cholesterol Calculated (mg/dL)   Date Value   03/17/2023 54             3/26/2024   General Health   How would you rate your overall physical health? Good   Feel stress (tense, anxious, or unable to sleep) To some extent   (!) STRESS CONCERN      3/26/2024   Nutrition   Three or more servings of calcium each day? (!) NO   Diet: Diabetic   How many servings of fruit and vegetables per day? (!) 0-1   How many sweetened beverages each day? 0-1         3/26/2024   Exercise   Days per week of moderate/strenous exercise 3 days         3/26/2024   Social Factors   Frequency of gathering with friends or relatives Once a week   Worry food won't last until get money to buy more No   Food not last or not have enough money for food? No   Do you have housing?  Yes   Are you worried about losing your housing? No   Lack of transportation? No   Unable to get utilities (heat,electricity)? No          No data to display                   3/26/2024   Dental   Dentist two times every  "year? Yes         3/26/2024   TB Screening   Were you born outside of the [place]? Yes           Today's PHQ-2 Score:       1/5/2024     7:43 AM   PHQ-2 ( 1999 [employer])   Q1: Little interest or pleasure in doing things 0   Q2: Feeling down, depressed or hopeless 0   PHQ-2 Score 0         3/26/2024   Substance Use   Alcohol more than 3/day or more than 7/wk No   Do you use any other substances recreationally? No     Social History     Tobacco Use    Smoking status: Never    Smokeless tobacco: Never   Vaping Use    Vaping Use: Never used   Substance Use Topics    Alcohol use: Not Currently    Drug use: Never             3/26/2024   One time HIV Screening   Previous HIV test? No         3/26/2024   STI Screening   New sexual partner(s) since last STI/HIV test? No   Last PSA: No results found for: \"PSA\"  ASCVD Risk   The ASCVD Risk score (Marry CORTEZ, et al., 2019) failed to calculate for the following reasons:    The valid total cholesterol range is 130 to 320 mg/dL           Reviewed and updated as needed this visit by Provider                          Review of Systems  Constitutional, HEENT, cardiovascular, pulmonary, gi and gu systems are negative, except as otherwise noted.     Objective    Exam  /78   Pulse 75   Temp 98.1  F (36.7  C) (Oral)   Resp 15   Ht 1.664 m (5' 5.5\")   Wt 80.7 kg (178 lb)   SpO2 96%   BMI 29.17 kg/m     Estimated body mass index is 29.17 kg/m  as calculated from the following:    Height as of this encounter: 1.664 m (5' 5.5\").    Weight as of this encounter: 80.7 kg (178 lb).    Physical Exam  GENERAL: alert and no distress  EYES: Eyes grossly normal to inspection, PERRL and conjunctivae and sclerae normal  HENT: ear canals and TM's normal, nose and mouth without ulcers or lesions  NECK: no adenopathy, no asymmetry, masses, or scars  RESP: lungs clear to auscultation - no rales, rhonchi or wheezes  CV: regular rate and rhythm, normal S1 S2, no S3 or S4, no murmur, click or " rub, no peripheral edema  ABDOMEN: soft, nontender, no hepatosplenomegaly, no masses and bowel sounds normal  RECTAL: normal sphincter tone, no rectal masses, prostate normal size, smooth, nontender without nodules or masses  MS: no gross musculoskeletal defects noted, no edema  SKIN: no suspicious lesions or rashes  NEURO: Normal strength and tone, mentation intact and speech normal  PSYCH: mentation appears normal, affect normal/bright  Diabetic foot exam:  Normal DP pulses and monofilament        Signed Electronically by: Belinda Santizo MD

## 2024-03-26 NOTE — PATIENT INSTRUCTIONS
Preventive Care Advice   This is general advice given by our system to help you stay healthy. However, your care team may have specific advice just for you. Please talk to your care team about your preventive care needs.  Nutrition  Eat 5 or more servings of fruits and vegetables each day.  Try wheat bread, brown rice and whole grain pasta (instead of white bread, rice, and pasta).  Get enough calcium and vitamin D. Check the label on foods and aim for 100% of the RDA (recommended daily allowance).  Lifestyle  Exercise at least 150 minutes each week   (30 minutes a day, 5 days a week).  Do muscle strengthening activities 2 days a week. These help control your weight and prevent disease.  No smoking.  Wear sunscreen to prevent skin cancer.  Have a dental exam and cleaning every 6 months.  Yearly exams  See your health care team every year to talk about:  Any changes in your health.  Any medicines your care team has prescribed.  Preventive care, family planning, and ways to prevent chronic diseases.  Shots (vaccines)   HPV shots (up to age 26), if you've never had them before.  Hepatitis B shots (up to age 59), if you've never had them before.  COVID-19 shot: Get this shot when it's due.  Flu shot: Get a flu shot every year.  Tetanus shot: Get a tetanus shot every 10 years.  Pneumococcal, hepatitis A, and RSV shots: Ask your care team if you need these based on your risk.  Shingles shot (for age 50 and up).  General health tests  Diabetes screening:  Starting at age 35, Get screened for diabetes at least every 3 years.  If you are younger than age 35, ask your care team if you should be screened for diabetes.  Cholesterol test: At age 39, start having a cholesterol test every 5 years, or more often if advised.  Bone density scan (DEXA): At age 50, ask your care team if you should have this scan for osteoporosis (brittle bones).  Hepatitis C: Get tested at least once in your life.  STIs (sexually transmitted  infections)  Before age 24: Ask your care team if you should be screened for STIs.  After age 24: Get screened for STIs if you're at risk. You are at risk for STIs (including HIV) if:  You are sexually active with more than one person.  You don't use condoms every time.  You or a partner was diagnosed with a sexually transmitted infection.  If you are at risk for HIV, ask about PrEP medicine to prevent HIV.  Get tested for HIV at least once in your life, whether you are at risk for HIV or not.  Cancer screening tests  Cervical cancer screening: If you have a cervix, begin getting regular cervical cancer screening tests at age 21. Most people who have regular screenings with normal results can stop after age 65. Talk about this with your provider.  Breast cancer scan (mammogram): If you've ever had breasts, begin having regular mammograms starting at age 40. This is a scan to check for breast cancer.  Colon cancer screening: It is important to start screening for colon cancer at age 45.  Have a colonoscopy test every 10 years (or more often if you're at risk) Or, ask your provider about stool tests like a FIT test every year or Cologuard test every 3 years.  To learn more about your testing options, visit: https://www.[employer]/367267.pdf.  For help making a decision, visit: https://bit.ly/go54236.  Prostate cancer screening test: If you have a prostate and are age 55 to 69, ask your provider if you would benefit from a yearly prostate cancer screening test.  Lung cancer screening: If you are a current or former smoker age 50 to 80, ask your care team if ongoing lung cancer screenings are right for you.  For informational purposes only. Not to replace the advice of your health care provider. Copyright   2023 [employer]. All rights reserved. Clinically reviewed by the [facility]. [place] [id number] - REV 01/24.    Learning About Stress  What is stress?     Stress is your  body's response to a hard situation. Your body can have a physical, emotional, or mental response. Stress is a fact of life for most people, and it affects everyone differently. What causes stress for you may not be stressful for someone else.  A lot of things can cause stress. You may feel stress when you go on a job interview, take a test, or run a race. This kind of short-term stress is normal and even useful. It can help you if you need to work hard or react quickly. For example, stress can help you finish an important job on time.  Long-term stress is caused by ongoing stressful situations or events. Examples of long-term stress include long-term health problems, ongoing problems at work, or conflicts in your family. Long-term stress can harm your health.  How does stress affect your health?  When you are stressed, your body responds as though you are in danger. It makes hormones that speed up your heart, make you breathe faster, and give you a burst of energy. This is called the fight-or-flight stress response. If the stress is over quickly, your body goes back to normal and no harm is done.  But if stress happens too often or lasts too long, it can have bad effects. Long-term stress can make you more likely to get sick, and it can make symptoms of some diseases worse. If you tense up when you are stressed, you may develop neck, shoulder, or low back pain. Stress is linked to high blood pressure and heart disease.  Stress also harms your emotional health. It can make you thorpe, tense, or depressed. Your relationships may suffer, and you may not do well at work or school.  What can you do to manage stress?  You can try these things to help manage stress:   Do something active. Exercise or activity can help reduce stress. Walking is a great way to get started. Even everyday activities such as housecleaning or yard work can help.  Try yoga or yolanda chi. These techniques combine exercise and meditation. You may need  some training at first to learn them.  Do something you enjoy. For example, listen to music or go to a movie. Practice your hobby or do volunteer work.  Meditate. This can help you relax, because you are not worrying about what happened before or what may happen in the future.  Do guided imagery. Imagine yourself in any setting that helps you feel calm. You can use online videos, books, or a teacher to guide you.  Do breathing exercises. For example:  From a standing position, bend forward from the waist with your knees slightly bent. Let your arms dangle close to the floor.  Breathe in slowly and deeply as you return to a standing position. Roll up slowly and lift your head last.  Hold your breath for just a few seconds in the standing position.  Breathe out slowly and bend forward from the waist.  Let your feelings out. Talk, laugh, cry, and express anger when you need to. Talking with supportive friends or family, a counselor, or a melonie leader about your feelings is a healthy way to relieve stress. Avoid discussing your feelings with people who make you feel worse.  Write. It may help to write about things that are bothering you. This helps you find out how much stress you feel and what is causing it. When you know this, you can find better ways to cope.  What can you do to prevent stress?  You might try some of these things to help prevent stress:  Manage your time. This helps you find time to do the things you want and need to do.  Get enough sleep. Your body recovers from the stresses of the day while you are sleeping.  Get support. Your family, friends, and community can make a difference in how you experience stress.  Limit your news feed. Avoid or limit time on social media or news that may make you feel stressed.  Do something active. Exercise or activity can help reduce stress. Walking is a great way to get started.  Where can you learn more?  Go to https://www.healthwise.net/patiented  Enter N032 in the  "search box to learn more about \"Learning About Stress.\"  Current as of: October 24, 2023               Content Version: 14.0    0481-7548 [employer].   Care instructions adapted under license by your healthcare professional. If you have questions about a medical condition or this instruction, always ask your healthcare professional. [employer] disclaims any warranty or liability for your use of this information.      "

## 2024-05-11 DIAGNOSIS — E11.9 TYPE 2 DIABETES MELLITUS WITHOUT COMPLICATION, WITH LONG-TERM CURRENT USE OF INSULIN (H): ICD-10-CM

## 2024-05-11 DIAGNOSIS — Z79.4 TYPE 2 DIABETES MELLITUS WITHOUT COMPLICATION, WITH LONG-TERM CURRENT USE OF INSULIN (H): ICD-10-CM

## 2024-06-17 PROBLEM — Z71.89 ADVANCED DIRECTIVES, COUNSELING/DISCUSSION: Status: RESOLVED | Noted: 2023-09-27 | Resolved: 2024-06-17

## 2024-06-29 ENCOUNTER — HEALTH MAINTENANCE LETTER (OUTPATIENT)
Age: 59
End: 2024-06-29

## 2024-07-17 ENCOUNTER — NURSE TRIAGE (OUTPATIENT)
Dept: FAMILY MEDICINE | Facility: CLINIC | Age: 59
End: 2024-07-17
Payer: COMMERCIAL

## 2024-07-17 DIAGNOSIS — U07.1 INFECTION DUE TO 2019 NOVEL CORONAVIRUS: Primary | ICD-10-CM

## 2024-07-17 NOTE — TELEPHONE ENCOUNTER
RN COVID TREATMENT VISIT  07/17/24      The patient has been triaged and does not require a higher level of care.    Alejandro Flores  59 year old  Current weight? 178    Has the patient been seen by a primary care provider at an [facility] or [facility] within the past two years? Yes.   Have you been in close proximity to/do you have a known exposure to a person with a confirmed case of influenza? No.     General treatment eligibility:  Date of positive COVID test (PCR or at home)?  7/16/24    Are you or have you been hospitalized for this COVID-19 infection? No.   Have you received monoclonal antibodies or antiviral treatment for COVID-19 since this positive test? No.   Do you have any of the following conditions that place you at risk of being very sick from COVID-19?   - Age 50 years or older  - Diabetes mellitus, type 1 and type 2  - Overweight or Obesity (BMI >85th percentile or BMI 25 or higher)  Yes, patient has at least one high risk condition as noted above.     Current COVID symptoms:   - fever or chills  - cough  - muscle or body aches  - headache  Yes. Patient has at least one symptom as selected.     How many days since symptoms started? 5 days or less. Established patient, 12 years or older weighing at least 88.2 lbs, who has symptoms that started in the past 5 days, has not been hospitalized nor received treatment already, and is at risk for being very sick from COVID-19.     Treatment eligibility by RN:  Are you currently pregnant or nursing? No  Do you have a clinically significant hypersensitivity to nirmatrelvir or ritonavir, or toxic epidermal necrolysis (TEN) or Manley-Guy Syndrome? No  Do you have a history of hepatitis, any hepatic impairment on the Problem List (such as Child-Leyva Class C, cirrhosis, fatty liver disease, alcoholic liver disease), or was the last liver lab (hepatic panel, ALT, AST, ALK Phos, bilirubin) elevated in the past 6 months?  No  Do you have any history of severe renal impairment (eGFR < 30mL/min)? No    Is patient eligible to continue? Yes, patient meets all eligibility requirements for the RN COVID treatment (as denoted by all no responses above).     Current Outpatient Medications   Medication Sig Dispense Refill    aspirin 81 MG tablet Take 1 tablet by mouth daily.      B Complex-C (VITAMIN B COMPLEX W/VITAMIN C) TABS tablet Take 1 tablet by mouth daily      Continuous Blood Gluc Sensor (FREESTYLE GRACIE 3 SENSOR) MISC 1 Product every 14 days 6 each 4    Continuous Blood Gluc Sensor (FREESTYLE GRACIE 3 SENSOR) MISC 1 Box continuous 6 each 11    cyclobenzaprine (FLEXERIL) 10 MG tablet Take 1 tablet (10 mg) by mouth 3 times daily as needed for muscle spasms 30 tablet 0    dulaglutide (TRULICITY) 1.5 MG/0.5ML pen Inject 1.5 mg Subcutaneous every 7 days 6 mL 1    insulin glargine (LANTUS PEN) 100 UNIT/ML pen Inject 30 Units Subcutaneous at bedtime 30 mL 1    metFORMIN (GLUCOPHAGE) 1000 MG tablet Take 1 tablet (1,000 mg) by mouth 2 times daily (with meals) -due for lab visit- 180 tablet 0    Multiple Vitamins-Minerals (MULTI ADULT GUMMIES PO)       Respiratory Therapy Supplies (CARETOUCH CPAP & BIPAP HOSE) MISC       rosuvastatin (CRESTOR) 10 MG tablet Take 1 tablet (10 mg) by mouth daily 90 tablet 3    sildenafil (REVATIO) 20 MG tablet Take 20-60 mg by mouth      Turmeric 500 MG CAPS       Vitamin D3 (CHOLECALCIFEROL) 125 MCG (5000 UT) tablet Take by mouth daily         Medications from List 1 of the standing order (on medications that exclude the use of Paxlovid) that patient is taking: NONE. Is patient taking Robel's Wort? No  Is patient taking [place]'s Wort or any meds from List 1? No.   Medications from List 2 of the standing order (on meds that provider needs to adjust) that patient is taking: NONE. Is patient on any of the meds from List 2? No.   Medications from List 3 of standing order (on meds that a RN needs to adjust) that  patient is taking: rosuvastatin (Crestor): Instructed patient to stop rosuvastatin while taking Paxlovid and restart rosuvastatin 1 day after the completion of Paxlovid.   sildenafil (Viagra, Revatio): Is patient using for erectile dysfunction? Yes, instructed patient to stop taking sildenafil while taking Paxlovid and restart sildenafil 3 days after the completion of Paxlovid. Is patient on any meds from List 3? Yes. Patient is on meds from list 3. No meds require a provider visit and at least one med required RN to adjust.     Paxlovid has an approximate 90% reduction in hospitalization. Paxlovid can possibly cause altered sense of taste, diarrhea (loose, watery stools), high blood pressure, muscle aches.     Would patient like a Paxlovid prescription?   Yes.   Lab Results   Component Value Date    GFRESTIMATED >90 01/05/2024       Was last eGFR reduced? No, eGFR 60 or greater/ No Result on record. Patient can receive the normal renal function dose. Paxlovid Rx sent to [facility]   [facility]    Temporary change to home medications: Crestor and Revatio to stop and restart after completion of Paxlovid.  Info sent via [employer] as well.      All medication adjustments (holds, etc) were discussed with the patient and patient was asked to repeat back (teachback) their med adjustment.  Did patient understand med adjustment? Yes, patient repeated back and understood correctly.        Reviewed the following instructions with the patient:    Paxlovid (nimatrelvir and ritonavir)    How it works  Two medicines (nirmatrelvir and ritonavir) are taken together. They stop the virus from growing. Less amount of virus is easier for your body to fight.    How to take  Medicine comes in a daily container with both medicine tablets. Take by mouth twice daily (once in the morning, once at night) for 5 days.  The number of tablets to take varies by patient.  Don't chew or break capsules.  Swallow whole.    When to take  Take as soon as possible after positive COVID-19 test result, and within 5 days of your first symptoms.    Possible side effects  Can cause altered sense of taste, diarrhea (loose, watery stools), high blood pressure, muscle aches.    Discussed the above with the patient.  He verbalized understanding and will call back if he has any issues.      Kristina M Kjellberg, RN         Additional Information   Negative: SEVERE difficulty breathing (e.g., struggling for each breath, speaks in single words)   Negative: Difficult to awaken or acting confused (e.g., disoriented, slurred speech)   Negative: Bluish (or gray) lips or face now   Negative: Shock suspected (e.g., cold/pale/clammy skin, too weak to stand, low BP, rapid pulse)   Negative: Sounds like a life-threatening emergency to the triager   Negative: Diagnosed or suspected COVID-19 and symptoms lasting 3 or more weeks   Negative: COVID-19 exposure and no symptoms   Negative: COVID-19 vaccine reaction suspected (e.g., fever, headache, muscle aches) occurring 1 to 3 days after getting vaccine   Negative: COVID-19 vaccine, questions about   Negative: Lives with someone known to have influenza (flu test positive) and flu-like symptoms (e.g., cough, runny nose, sore throat, SOB; with or without fever)   Negative: Possible COVID-19 symptoms and triager concerned about severity of symptoms or other causes   Negative: COVID-19 and breastfeeding, questions about   Negative: SEVERE or constant chest pain or pressure  (Exception: Mild central chest pain, present only when coughing.)   Negative: MODERATE difficulty breathing (e.g., speaks in phrases, SOB even at rest, pulse 100-120)   Negative: Headache and stiff neck (can't touch chin to chest)   Negative: Oxygen level (e.g., pulse oximetry) 90% or lower   Negative: Chest pain or pressure  (Exception: MILD central chest pain, present only when coughing.)   Negative: Drinking very little and  "dehydration suspected (e.g., no urine > 12 hours, very dry mouth, very lightheaded)   Negative: Patient sounds very sick or weak to the triager   Negative: MILD difficulty breathing (e.g., minimal/no SOB at rest, SOB with walking, pulse <100)   Negative: Fever > 103 F (39.4 C)   Negative: Fever > 101 F (38.3 C) and over 60 years of age   Negative: Fever > 100.0 F (37.8 C) and bedridden (e.g., CVA, chronic illness, recovering from surgery)    Answer Assessment - Initial Assessment Questions  1. COVID-19 DIAGNOSIS: \"How do you know that you have COVID?\" (e.g., positive lab test or self-test, diagnosed by doctor or NP/PA, symptoms after exposure).      Home test last night  2. COVID-19 EXPOSURE: \"Was there any known exposure to COVID before the symptoms began?\" CDC Definition of close contact: within 6 feet (2 meters) for a total of 15 minutes or more over a 24-hour period.       No exposure  3. ONSET: \"When did the COVID-19 symptoms start?\"       Sunday night  4. WORST SYMPTOM: \"What is your worst symptom?\" (e.g., cough, fever, shortness of breath, muscle aches)      Headache and runny nose  5. COUGH: \"Do you have a cough?\" If Yes, ask: \"How bad is the cough?\"        Small cough now  6. FEVER: \"Do you have a fever?\" If Yes, ask: \"What is your temperature, how was it measured, and when did it start?\"      Fevers on and off  7. RESPIRATORY STATUS: \"Describe your breathing?\" (e.g., normal; shortness of breath, wheezing, unable to speak)       Normal breathing  8. BETTER-SAME-WORSE: \"Are you getting better, staying the same or getting worse compared to yesterday?\"  If getting worse, ask, \"In what way?\"      Staying the same  9. OTHER SYMPTOMS: \"Do you have any other symptoms?\"  (e.g., chills, fatigue, headache, loss of smell or taste, muscle pain, sore throat)      Chills, headache, muscle pain (hard to walk),   10. HIGH RISK DISEASE: \"Do you have any chronic medical problems?\" (e.g., asthma, heart or lung disease, weak " "immune system, obesity, etc.)        Diabetes, Overweight  11. VACCINE: \"Have you had the COVID-19 vaccine?\" If Yes, ask: \"Which one, how many shots, when did you get it?\"        Yes, couple years since last one  12. PREGNANCY: \"Is there any chance you are pregnant?\" \"When was your last menstrual period?\"        NA  13. O2 SATURATION MONITOR:  \"Do you use an oxygen saturation monitor (pulse oximeter) at home?\" If Yes, ask \"What is your reading (oxygen level) today?\" \"What is your usual oxygen saturation reading?\" (e.g., 95%)        NA    Protocols used: Coronavirus (COVID-19) Diagnosed or [id number]    "

## 2024-08-12 ENCOUNTER — LAB (OUTPATIENT)
Dept: LAB | Facility: CLINIC | Age: 59
End: 2024-08-12
Payer: COMMERCIAL

## 2024-08-12 DIAGNOSIS — E11.9 DIABETES MELLITUS, TYPE 2 (H): Primary | ICD-10-CM

## 2024-08-12 LAB — HBA1C MFR BLD: 7.5 % (ref 0–5.6)

## 2024-08-12 PROCEDURE — 36415 COLL VENOUS BLD VENIPUNCTURE: CPT

## 2024-08-12 PROCEDURE — 83036 HEMOGLOBIN GLYCOSYLATED A1C: CPT

## 2024-08-13 DIAGNOSIS — Z79.4 TYPE 2 DIABETES MELLITUS WITHOUT COMPLICATION, WITH LONG-TERM CURRENT USE OF INSULIN (H): ICD-10-CM

## 2024-08-13 DIAGNOSIS — E11.9 TYPE 2 DIABETES MELLITUS WITHOUT COMPLICATION, WITH LONG-TERM CURRENT USE OF INSULIN (H): ICD-10-CM

## 2024-08-31 DIAGNOSIS — Z79.4 TYPE 2 DIABETES MELLITUS WITHOUT COMPLICATION, WITH LONG-TERM CURRENT USE OF INSULIN (H): ICD-10-CM

## 2024-08-31 DIAGNOSIS — E11.9 TYPE 2 DIABETES MELLITUS WITHOUT COMPLICATION, WITH LONG-TERM CURRENT USE OF INSULIN (H): ICD-10-CM

## 2024-08-31 RX ORDER — DULAGLUTIDE 1.5 MG/.5ML
1.5 INJECTION, SOLUTION SUBCUTANEOUS
Qty: 6 ML | Refills: 0 | Status: SHIPPED | OUTPATIENT
Start: 2024-08-31

## 2024-10-01 ENCOUNTER — MYC MEDICAL ADVICE (OUTPATIENT)
Dept: FAMILY MEDICINE | Facility: CLINIC | Age: 59
End: 2024-10-01
Payer: COMMERCIAL

## 2024-10-01 DIAGNOSIS — Z79.4 TYPE 2 DIABETES MELLITUS WITHOUT COMPLICATION, WITH LONG-TERM CURRENT USE OF INSULIN (H): Primary | ICD-10-CM

## 2024-10-01 DIAGNOSIS — E11.9 TYPE 2 DIABETES MELLITUS WITHOUT COMPLICATION, WITH LONG-TERM CURRENT USE OF INSULIN (H): Primary | ICD-10-CM

## 2024-10-01 NOTE — TELEPHONE ENCOUNTER
Patient requesting order for pen needles for his insulin. Taking Lantus every evening. No current order for pen needles on medication list.  Routing to provider for review.

## 2024-10-30 DIAGNOSIS — E11.9 TYPE 2 DIABETES MELLITUS WITHOUT COMPLICATION, WITH LONG-TERM CURRENT USE OF INSULIN (H): ICD-10-CM

## 2024-10-30 DIAGNOSIS — Z79.4 TYPE 2 DIABETES MELLITUS WITHOUT COMPLICATION, WITH LONG-TERM CURRENT USE OF INSULIN (H): ICD-10-CM

## 2024-10-31 DIAGNOSIS — E78.00 PURE HYPERCHOLESTEROLEMIA: ICD-10-CM

## 2024-10-31 RX ORDER — ROSUVASTATIN CALCIUM 10 MG/1
10 TABLET, COATED ORAL DAILY
Qty: 90 TABLET | Refills: 0 | Status: SHIPPED | OUTPATIENT
Start: 2024-10-31

## 2024-11-01 RX ORDER — INSULIN GLARGINE-YFGN 100 [IU]/ML
30 INJECTION, SOLUTION SUBCUTANEOUS AT BEDTIME
Qty: 30 ML | Refills: 1 | Status: SHIPPED | OUTPATIENT
Start: 2024-11-01

## 2024-12-02 ENCOUNTER — OFFICE VISIT (OUTPATIENT)
Dept: FAMILY MEDICINE | Facility: CLINIC | Age: 59
End: 2024-12-02
Payer: COMMERCIAL

## 2024-12-02 VITALS
HEART RATE: 74 BPM | WEIGHT: 178 LBS | RESPIRATION RATE: 16 BRPM | HEIGHT: 66 IN | SYSTOLIC BLOOD PRESSURE: 124 MMHG | OXYGEN SATURATION: 98 % | BODY MASS INDEX: 28.61 KG/M2 | TEMPERATURE: 97.9 F | DIASTOLIC BLOOD PRESSURE: 71 MMHG

## 2024-12-02 DIAGNOSIS — Z79.4 TYPE 2 DIABETES MELLITUS WITHOUT COMPLICATION, WITH LONG-TERM CURRENT USE OF INSULIN (H): Primary | ICD-10-CM

## 2024-12-02 DIAGNOSIS — E78.00 PURE HYPERCHOLESTEROLEMIA: ICD-10-CM

## 2024-12-02 DIAGNOSIS — E11.9 TYPE 2 DIABETES MELLITUS WITHOUT COMPLICATION, WITH LONG-TERM CURRENT USE OF INSULIN (H): Primary | ICD-10-CM

## 2024-12-02 LAB
CHOLEST SERPL-MCNC: 137 MG/DL
CREAT UR-MCNC: 217 MG/DL
EST. AVERAGE GLUCOSE BLD GHB EST-MCNC: 200 MG/DL
FASTING STATUS PATIENT QL REPORTED: YES
HBA1C MFR BLD: 8.6 % (ref 0–5.6)
HDLC SERPL-MCNC: 40 MG/DL
LDLC SERPL CALC-MCNC: 80 MG/DL
MICROALBUMIN UR-MCNC: 14.5 MG/L
MICROALBUMIN/CREAT UR: 6.68 MG/G CR (ref 0–17)
NONHDLC SERPL-MCNC: 97 MG/DL
TRIGL SERPL-MCNC: 83 MG/DL

## 2024-12-02 PROCEDURE — 90673 RIV3 VACCINE NO PRESERV IM: CPT | Performed by: PHYSICIAN ASSISTANT

## 2024-12-02 PROCEDURE — 91320 SARSCV2 VAC 30MCG TRS-SUC IM: CPT | Performed by: PHYSICIAN ASSISTANT

## 2024-12-02 PROCEDURE — 90480 ADMN SARSCOV2 VAC 1/ONLY CMP: CPT | Performed by: PHYSICIAN ASSISTANT

## 2024-12-02 PROCEDURE — 83036 HEMOGLOBIN GLYCOSYLATED A1C: CPT | Performed by: PHYSICIAN ASSISTANT

## 2024-12-02 PROCEDURE — 82043 UR ALBUMIN QUANTITATIVE: CPT | Performed by: PHYSICIAN ASSISTANT

## 2024-12-02 PROCEDURE — 80061 LIPID PANEL: CPT | Performed by: PHYSICIAN ASSISTANT

## 2024-12-02 PROCEDURE — 36415 COLL VENOUS BLD VENIPUNCTURE: CPT | Performed by: PHYSICIAN ASSISTANT

## 2024-12-02 PROCEDURE — 90471 IMMUNIZATION ADMIN: CPT | Performed by: PHYSICIAN ASSISTANT

## 2024-12-02 PROCEDURE — 99214 OFFICE O/P EST MOD 30 MIN: CPT | Mod: 25 | Performed by: PHYSICIAN ASSISTANT

## 2024-12-02 PROCEDURE — 82570 ASSAY OF URINE CREATININE: CPT | Performed by: PHYSICIAN ASSISTANT

## 2024-12-02 RX ORDER — ROSUVASTATIN CALCIUM 10 MG/1
10 TABLET, COATED ORAL DAILY
Qty: 90 TABLET | Refills: 1 | Status: SHIPPED | OUTPATIENT
Start: 2024-12-02

## 2024-12-02 RX ORDER — DULAGLUTIDE 1.5 MG/.5ML
1.5 INJECTION, SOLUTION SUBCUTANEOUS
Qty: 6 ML | Refills: 1 | Status: SHIPPED | OUTPATIENT
Start: 2024-12-02 | End: 2024-12-02

## 2024-12-02 RX ORDER — ROSUVASTATIN CALCIUM 10 MG/1
10 TABLET, COATED ORAL DAILY
Qty: 90 TABLET | Refills: 0 | Status: SHIPPED | OUTPATIENT
Start: 2024-12-02 | End: 2024-12-02

## 2024-12-02 ASSESSMENT — PAIN SCALES - GENERAL: PAINLEVEL_OUTOF10: NO PAIN (0)

## 2024-12-03 ENCOUNTER — TELEPHONE (OUTPATIENT)
Dept: FAMILY MEDICINE | Facility: CLINIC | Age: 59
End: 2024-12-03
Payer: COMMERCIAL

## 2024-12-04 NOTE — TELEPHONE ENCOUNTER
Prior Authorization Not Needed per Insurance    Medication: MOUNJARO 2.5 MG/0.5ML SC SOAJ  Insurance Company: [employer] [place] - Phone [phone] Fax [phone]  Expected CoPay: $    Pharmacy Filling the Rx:    Pharmacy Notified: Yes on order  Patient Notified: Yes- will be notified when ready

## 2025-01-03 ENCOUNTER — TELEPHONE (OUTPATIENT)
Dept: FAMILY MEDICINE | Facility: CLINIC | Age: 60
End: 2025-01-03
Payer: COMMERCIAL

## 2025-01-03 NOTE — TELEPHONE ENCOUNTER
[facility] Pharm[facility] Prior Authorization Team   Phone: [phone]    PRIOR AUTHORIZATION DENIED    INSURANCE DID NOT RECEIVE THE CHART NOTES WITH LABS THAT WERE ATTACHED TO THE REQUEST - WILL RESUBMIT VIA CMM    Medication: MOUNJARO 5 MG/0.5ML SC SOAJ  Insurance Company: [employer] [place] - Phone [phone] Fax [phone]  Denial Date: 1/3/2025  Denial Reason(s): MUST PROVIDE DOCUMENTATION CONFIRMING DX      Appeal Information: IF THE PROVIDER WOULD LIKE TO APPEAL THIS DECISION PLEASE PROVIDE THE PA TEAM WITH A LETTER OF MEDICAL NECESSITY      Patient Notified: NO

## 2025-01-05 NOTE — TELEPHONE ENCOUNTER
Prior Authorization Not Needed per Insurance    Medication: MOUNJARO 5 MG/0.5ML SC SOAJ  Insurance Company: [employer] [place] - Phone [phone] Fax [phone]  Expected CoPay: $    Pharmacy Filling the Rx: [facility] [id number] [place] [address], [address]  Pharmacy Notified: YES  Patient Notified: YES (faxed letter to pharmacy and pharmacy will notify the patient when ready)

## 2025-02-03 DIAGNOSIS — Z79.4 TYPE 2 DIABETES MELLITUS WITHOUT COMPLICATION, WITH LONG-TERM CURRENT USE OF INSULIN (H): ICD-10-CM

## 2025-02-03 DIAGNOSIS — E11.9 TYPE 2 DIABETES MELLITUS WITHOUT COMPLICATION, WITH LONG-TERM CURRENT USE OF INSULIN (H): ICD-10-CM

## 2025-02-03 RX ORDER — KETOROLAC TROMETHAMINE 30 MG/ML
1 INJECTION, SOLUTION INTRAMUSCULAR; INTRAVENOUS ONCE
Qty: 1 EACH | Refills: 0 | Status: SHIPPED | OUTPATIENT
Start: 2025-02-03 | End: 2025-02-03

## 2025-02-03 RX ORDER — ACYCLOVIR 800 MG/1
TABLET ORAL
Qty: 6 EACH | Refills: 4 | Status: SHIPPED | OUTPATIENT
Start: 2025-02-03

## 2025-02-19 ENCOUNTER — TELEPHONE (OUTPATIENT)
Dept: FAMILY MEDICINE | Facility: CLINIC | Age: 60
End: 2025-02-19
Payer: COMMERCIAL

## 2025-02-19 NOTE — TELEPHONE ENCOUNTER
Patient Quality Outreach    Patient is due for the following:   Depression  -  Depression follow-up visit    Action(s) Taken:   Patient has upcoming appointment, these items will be addressed at that time.    Type of outreach:    Chart review performed, no outreach needed.    Questions for provider review:    None           Harper Saleem MA

## 2025-02-24 ENCOUNTER — PATIENT OUTREACH (OUTPATIENT)
Dept: CARE COORDINATION | Facility: CLINIC | Age: 60
End: 2025-02-24
Payer: COMMERCIAL

## 2025-02-26 SDOH — HEALTH STABILITY: PHYSICAL HEALTH: ON AVERAGE, HOW MANY MINUTES DO YOU ENGAGE IN EXERCISE AT THIS LEVEL?: 10 MIN

## 2025-02-26 SDOH — HEALTH STABILITY: PHYSICAL HEALTH: ON AVERAGE, HOW MANY DAYS PER WEEK DO YOU ENGAGE IN MODERATE TO STRENUOUS EXERCISE (LIKE A BRISK WALK)?: 2 DAYS

## 2025-02-26 ASSESSMENT — SOCIAL DETERMINANTS OF HEALTH (SDOH): HOW OFTEN DO YOU GET TOGETHER WITH FRIENDS OR RELATIVES?: ONCE A WEEK

## 2025-02-26 NOTE — PATIENT INSTRUCTIONS
Patient Education   Preventive Care Advice   This is general advice given by our system to help you stay healthy. However, your care team may have specific advice just for you. Please talk to your care team about your preventive care needs.  Nutrition  Eat 5 or more servings of fruits and vegetables each day.  Try wheat bread, brown rice and whole grain pasta (instead of white bread, rice, and pasta).  Get enough calcium and vitamin D. Check the label on foods and aim for 100% of the RDA (recommended daily allowance).  Lifestyle  Exercise at least 150 minutes each week  (30 minutes a day, 5 days a week).  Do muscle strengthening activities 2 days a week. These help control your weight and prevent disease.  No smoking.  Wear sunscreen to prevent skin cancer.  Have a dental exam and cleaning every 6 months.  Yearly exams  See your health care team every year to talk about:  Any changes in your health.  Any medicines your care team has prescribed.  Preventive care, family planning, and ways to prevent chronic diseases.  Shots (vaccines)   HPV shots (up to age 26), if you've never had them before.  Hepatitis B shots (up to age 59), if you've never had them before.  COVID-19 shot: Get this shot when it's due.  Flu shot: Get a flu shot every year.  Tetanus shot: Get a tetanus shot every 10 years.  Pneumococcal, hepatitis A, and RSV shots: Ask your care team if you need these based on your risk.  Shingles shot (for age 50 and up)  General health tests  Diabetes screening:  Starting at age 35, Get screened for diabetes at least every 3 years.  If you are younger than age 35, ask your care team if you should be screened for diabetes.  Cholesterol test: At age 39, start having a cholesterol test every 5 years, or more often if advised.  Bone density scan (DEXA): At age 50, ask your care team if you should have this scan for osteoporosis (brittle bones).  Hepatitis C: Get tested at least once in your life.  STIs (sexually  transmitted infections)  Before age 24: Ask your care team if you should be screened for STIs.  After age 24: Get screened for STIs if you're at risk. You are at risk for STIs (including HIV) if:  You are sexually active with more than one person.  You don't use condoms every time.  You or a partner was diagnosed with a sexually transmitted infection.  If you are at risk for HIV, ask about PrEP medicine to prevent HIV.  Get tested for HIV at least once in your life, whether you are at risk for HIV or not.  Cancer screening tests  Cervical cancer screening: If you have a cervix, begin getting regular cervical cancer screening tests starting at age 21.  Breast cancer scan (mammogram): If you've ever had breasts, begin having regular mammograms starting at age 40. This is a scan to check for breast cancer.  Colon cancer screening: It is important to start screening for colon cancer at age 45.  Have a colonoscopy test every 10 years (or more often if you're at risk) Or, ask your provider about stool tests like a FIT test every year or Cologuard test every 3 years.  To learn more about your testing options, visit:   .  For help making a decision, visit:   https://bit.ly/wk65749.  Prostate cancer screening test: If you have a prostate, ask your care team if a prostate cancer screening test (PSA) at age 55 is right for you.  Lung cancer screening: If you are a current or former smoker ages 50 to 80, ask your care team if ongoing lung cancer screenings are right for you.  For informational purposes only. Not to replace the advice of your health care provider. Copyright   2023 [employer]. All rights reserved. Clinically reviewed by the [facility] Transitions Program. [employer] [id number] - REV 01/24.

## 2025-03-03 ENCOUNTER — OFFICE VISIT (OUTPATIENT)
Dept: FAMILY MEDICINE | Facility: CLINIC | Age: 60
End: 2025-03-03
Payer: COMMERCIAL

## 2025-03-03 VITALS
BODY MASS INDEX: 27.62 KG/M2 | OXYGEN SATURATION: 98 % | RESPIRATION RATE: 14 BRPM | SYSTOLIC BLOOD PRESSURE: 135 MMHG | DIASTOLIC BLOOD PRESSURE: 67 MMHG | HEART RATE: 87 BPM | WEIGHT: 176 LBS | TEMPERATURE: 97.4 F | HEIGHT: 67 IN

## 2025-03-03 DIAGNOSIS — Z12.5 SCREENING PSA (PROSTATE SPECIFIC ANTIGEN): ICD-10-CM

## 2025-03-03 DIAGNOSIS — Z00.00 ROUTINE GENERAL MEDICAL EXAMINATION AT A HEALTH CARE FACILITY: Primary | ICD-10-CM

## 2025-03-03 DIAGNOSIS — G47.33 OSA (OBSTRUCTIVE SLEEP APNEA): ICD-10-CM

## 2025-03-03 DIAGNOSIS — Z00.00 ROUTINE GENERAL MEDICAL EXAMINATION AT A HEALTH CARE FACILITY: ICD-10-CM

## 2025-03-03 DIAGNOSIS — Z79.4 TYPE 2 DIABETES MELLITUS WITHOUT COMPLICATION, WITH LONG-TERM CURRENT USE OF INSULIN (H): Primary | ICD-10-CM

## 2025-03-03 DIAGNOSIS — E11.9 TYPE 2 DIABETES MELLITUS WITHOUT COMPLICATION, WITH LONG-TERM CURRENT USE OF INSULIN (H): ICD-10-CM

## 2025-03-03 DIAGNOSIS — Z79.4 TYPE 2 DIABETES MELLITUS WITHOUT COMPLICATION, WITH LONG-TERM CURRENT USE OF INSULIN (H): ICD-10-CM

## 2025-03-03 DIAGNOSIS — E78.00 PURE HYPERCHOLESTEROLEMIA: ICD-10-CM

## 2025-03-03 DIAGNOSIS — E11.9 TYPE 2 DIABETES MELLITUS WITHOUT COMPLICATION, WITH LONG-TERM CURRENT USE OF INSULIN (H): Primary | ICD-10-CM

## 2025-03-03 LAB
EST. AVERAGE GLUCOSE BLD GHB EST-MCNC: 146 MG/DL
HBA1C MFR BLD: 6.7 % (ref 0–5.6)

## 2025-03-03 PROCEDURE — 80048 BASIC METABOLIC PNL TOTAL CA: CPT | Performed by: PHYSICIAN ASSISTANT

## 2025-03-03 PROCEDURE — 1126F AMNT PAIN NOTED NONE PRSNT: CPT | Performed by: PHYSICIAN ASSISTANT

## 2025-03-03 PROCEDURE — G0103 PSA SCREENING: HCPCS | Performed by: PHYSICIAN ASSISTANT

## 2025-03-03 PROCEDURE — 83036 HEMOGLOBIN GLYCOSYLATED A1C: CPT | Performed by: PHYSICIAN ASSISTANT

## 2025-03-03 PROCEDURE — 99396 PREV VISIT EST AGE 40-64: CPT | Performed by: PHYSICIAN ASSISTANT

## 2025-03-03 PROCEDURE — 36415 COLL VENOUS BLD VENIPUNCTURE: CPT | Performed by: PHYSICIAN ASSISTANT

## 2025-03-03 PROCEDURE — G2211 COMPLEX E/M VISIT ADD ON: HCPCS | Performed by: PHYSICIAN ASSISTANT

## 2025-03-03 PROCEDURE — 99213 OFFICE O/P EST LOW 20 MIN: CPT | Mod: 25 | Performed by: PHYSICIAN ASSISTANT

## 2025-03-03 PROCEDURE — 3075F SYST BP GE 130 - 139MM HG: CPT | Performed by: PHYSICIAN ASSISTANT

## 2025-03-03 PROCEDURE — 3078F DIAST BP <80 MM HG: CPT | Performed by: PHYSICIAN ASSISTANT

## 2025-03-03 RX ORDER — INSULIN GLARGINE-YFGN 100 [IU]/ML
20 INJECTION, SOLUTION SUBCUTANEOUS AT BEDTIME
Qty: 30 ML | Refills: 1 | Status: SHIPPED | OUTPATIENT
Start: 2025-03-03

## 2025-03-03 ASSESSMENT — PAIN SCALES - GENERAL: PAINLEVEL_OUTOF10: NO PAIN (0)

## 2025-03-03 NOTE — PROGRESS NOTES
Preventive Care Visit  [facility]  Tyler Barbosa PA-C, Physician Assistant - Medical  Mar 3, 2025      Assessment & Plan     Routine general medical examination at a health care facility  Completed     Type 2 diabetes mellitus without complication, with long-term current use of insulin (H)  Much improved   - BASIC METABOLIC PANEL; Future  - HEMOGLOBIN A1C; Future  - Insulin Glargine-yfgn (SEMGLEE, YFGN,) 100 UNIT/ML SOPN; Inject 20 Units subcutaneously at bedtime.  - BASIC METABOLIC PANEL  - HEMOGLOBIN A1C  A1c now 6.7 on most recent exam which is on par with data from CGM we reviewed in clinic. Has not had any issues with control or with medications. Has been adjusting insulin lower with time due to better control and the mounjaro.  I will change the insulin to 20 units daily to fit this need. Expect to stay at mounjaro 5 mg.  Due for eye exam, will complete    Pure hypercholesterolemia  Stable.  Continue on crestor without change     KAY (obstructive sleep apnea)  Stable     Screening PSA (prostate specific antigen)  recheck  - PSA, screen; Future  - PSA, screen    The longitudinal plan of care for the diagnosis(es)/condition(s) as documented were addressed during this visit. Due to the added complexity in care, I will continue to support Alejandro in the subsequent management and with ongoing continuity of care.          Counseling  Appropriate preventive services were addressed with this patient via screening, questionnaire, or discussion as appropriate for fall prevention, nutrition, physical activity, Tobacco-use cessation, social engagement, weight loss and cognition.  Checklist reviewing preventive services available has been given to the patient.  Reviewed patient's diet, addressing concerns and/or questions.   He is at risk for lack of exercise and has been provided with information to increase physical activity for the benefit of his well-being.       Follow up 6 months recheck  DM2    Rick Allen is a 59 year old, presenting for the following:  Physical and Diabetes        3/3/2025     2:19 PM   Additional Questions   Roomed by Harper Saleem MA   Accompanied by Self        HPI       Advance Care Planning  Patient does not have a Health Care Directive: Discussed advance care planning with patient; however, patient declined at this time.      2/26/2025   General Health   How would you rate your overall physical health? Good   Feel stress (tense, anxious, or unable to sleep) Only a little   (!) STRESS CONCERN      2/26/2025   Nutrition   Three or more servings of calcium each day? Yes   Diet: Diabetic   How many servings of fruit and vegetables per day? (!) 2-3   How many sweetened beverages each day? 0-1         2/26/2025   Exercise   Days per week of moderate/strenous exercise 2 days   Average minutes spent exercising at this level 10 min   (!) EXERCISE CONCERN      2/26/2025   Social Factors   Frequency of gathering with friends or relatives Once a week   Worry food won't last until get money to buy more No   Food not last or not have enough money for food? No   Do you have housing? (Housing is defined as stable permanent housing and does not include staying ouside in a car, in a tent, in an abandoned building, in an overnight shelter, or couch-surfing.) Yes   Are you worried about losing your housing? No   Lack of transportation? No   Unable to get utilities (heat,electricity)? No         2/26/2025   Fall Risk   Fallen 2 or more times in the past year? No   Trouble with walking or balance? No          2/26/2025   Dental   Dentist two times every year? Yes         3/26/2024   TB Screening   Were you born outside of the [place]? Yes         Today's PHQ-2 Score:       3/3/2025     2:20 PM   PHQ-2 ( 1999 [employer])   Q1: Little interest or pleasure in doing things 0   Q2: Feeling down, depressed or hopeless 0   PHQ-2 Score 0    Q1: Little interest or pleasure in doing things Not at all   Q2:  "Feeling down, depressed or hopeless Not at all   PHQ-2 Score 0       Patient-reported           2/26/2025   Substance Use   Alcohol more than 3/day or more than 7/wk No   Do you use any other substances recreationally? No     Social History     Tobacco Use    Smoking status: Never    Smokeless tobacco: Never   Vaping Use    Vaping status: Never Used   Substance Use Topics    Alcohol use: Not Currently    Drug use: Never           2/26/2025   STI Screening   New sexual partner(s) since last STI/HIV test? No   Last PSA:   Prostate Specific Antigen Screen   Date Value Ref Range Status   03/26/2024 3.03 0.00 - 4.50 ng/mL Final     ASCVD Risk   The 10-year ASCVD risk score (Marry CORETZ, et al., 2019) is: 15.9%    Values used to calculate the score:      Age: 59 years      Sex: Male      Is Non- : No      Diabetic: Yes      Tobacco smoker: No      Systolic Blood Pressure: 135 mmHg      Is BP treated: No      HDL Cholesterol: 40 mg/dL      Total Cholesterol: 137 mg/dL           Reviewed and updated as needed this visit by Provider   Tobacco  Allergies  Meds  Problems  Med Hx  Surg Hx  Fam Hx            Past Medical History:   Diagnosis Date    Diabetes (H)      Past Surgical History:   Procedure Laterality Date    CHOLECYSTECTOMY      COLONOSCOPY N/A 1/15/2024    Procedure: COLONOSCOPY, FLEXIBLE, WITH LESION REMOVAL USING SNARE;  Surgeon: Mark Lozano MD;  Location:  GI     Lab work is in process  Labs reviewed in EPIC         Objective    Exam  /67   Pulse 87   Temp 97.4  F (36.3  C) (Tympanic)   Resp 14   Ht 1.702 m (5' 7\")   Wt 79.8 kg (176 lb)   SpO2 98%   BMI 27.57 kg/m     Estimated body mass index is 27.57 kg/m  as calculated from the following:    Height as of this encounter: 1.702 m (5' 7\").    Weight as of this encounter: 79.8 kg (176 lb).    Physical Exam  GENERAL: alert and no distress  EYES: Eyes grossly normal to inspection, PERRL and conjunctivae " and sclerae normal  NECK: no adenopathy, no asymmetry, masses, or scars  RESP: lungs clear to auscultation - no rales, rhonchi or wheezes  CV: regular rate and rhythm, normal S1 S2, no S3 or S4, no murmur, click or rub, no peripheral edema  MS: no gross musculoskeletal defects noted, no edema  Diabetic foot exam: normal DP and PT pulses, no trophic changes or ulcerative lesions, and normal sensory exam        Signed Electronically by: Tyler Barbosa PA-C

## 2025-03-04 LAB
ANION GAP SERPL CALCULATED.3IONS-SCNC: 11 MMOL/L (ref 7–15)
BUN SERPL-MCNC: 11.7 MG/DL (ref 8–23)
CALCIUM SERPL-MCNC: 9.4 MG/DL (ref 8.8–10.4)
CHLORIDE SERPL-SCNC: 102 MMOL/L (ref 98–107)
CREAT SERPL-MCNC: 0.89 MG/DL (ref 0.67–1.17)
EGFRCR SERPLBLD CKD-EPI 2021: >90 ML/MIN/1.73M2
GLUCOSE SERPL-MCNC: 147 MG/DL (ref 70–99)
HCO3 SERPL-SCNC: 24 MMOL/L (ref 22–29)
POTASSIUM SERPL-SCNC: 4 MMOL/L (ref 3.4–5.3)
PSA SERPL DL<=0.01 NG/ML-MCNC: 3.21 NG/ML (ref 0–4.5)
SODIUM SERPL-SCNC: 137 MMOL/L (ref 135–145)

## 2025-03-10 ENCOUNTER — PATIENT OUTREACH (OUTPATIENT)
Dept: CARE COORDINATION | Facility: CLINIC | Age: 60
End: 2025-03-10
Payer: COMMERCIAL

## 2025-03-31 DIAGNOSIS — E11.9 TYPE 2 DIABETES MELLITUS WITHOUT COMPLICATION, WITH LONG-TERM CURRENT USE OF INSULIN (H): ICD-10-CM

## 2025-03-31 DIAGNOSIS — Z79.4 TYPE 2 DIABETES MELLITUS WITHOUT COMPLICATION, WITH LONG-TERM CURRENT USE OF INSULIN (H): ICD-10-CM

## 2025-03-31 RX ORDER — TIRZEPATIDE 5 MG/.5ML
INJECTION, SOLUTION SUBCUTANEOUS
Qty: 6 ML | Refills: 1 | Status: SHIPPED | OUTPATIENT
Start: 2025-03-31

## 2025-04-05 DIAGNOSIS — E11.9 TYPE 2 DIABETES MELLITUS WITHOUT COMPLICATION, WITH LONG-TERM CURRENT USE OF INSULIN (H): ICD-10-CM

## 2025-04-05 DIAGNOSIS — Z79.4 TYPE 2 DIABETES MELLITUS WITHOUT COMPLICATION, WITH LONG-TERM CURRENT USE OF INSULIN (H): ICD-10-CM

## 2025-04-07 RX ORDER — PEN NEEDLE, DIABETIC 32GX 5/32"
NEEDLE, DISPOSABLE MISCELLANEOUS
Qty: 100 EACH | Refills: 2 | Status: SHIPPED | OUTPATIENT
Start: 2025-04-07

## 2025-06-01 DIAGNOSIS — E11.9 TYPE 2 DIABETES MELLITUS WITHOUT COMPLICATION, WITH LONG-TERM CURRENT USE OF INSULIN (H): ICD-10-CM

## 2025-06-01 DIAGNOSIS — Z79.4 TYPE 2 DIABETES MELLITUS WITHOUT COMPLICATION, WITH LONG-TERM CURRENT USE OF INSULIN (H): ICD-10-CM

## 2025-06-19 ENCOUNTER — OFFICE VISIT (OUTPATIENT)
Dept: OPTOMETRY | Facility: CLINIC | Age: 60
End: 2025-06-19
Payer: COMMERCIAL

## 2025-06-19 DIAGNOSIS — H52.4 PRESBYOPIA: ICD-10-CM

## 2025-06-19 DIAGNOSIS — Z01.01 VISION EXAM WITH ABNORMAL FINDINGS: Primary | ICD-10-CM

## 2025-06-19 DIAGNOSIS — E11.9 TYPE 2 DIABETES MELLITUS WITHOUT COMPLICATION, WITH LONG-TERM CURRENT USE OF INSULIN (H): ICD-10-CM

## 2025-06-19 DIAGNOSIS — H52.222 REGULAR ASTIGMATISM OF LEFT EYE: ICD-10-CM

## 2025-06-19 DIAGNOSIS — Z79.4 TYPE 2 DIABETES MELLITUS WITHOUT COMPLICATION, WITH LONG-TERM CURRENT USE OF INSULIN (H): ICD-10-CM

## 2025-06-19 ASSESSMENT — CONF VISUAL FIELD
OD_NORMAL: 1
OD_INFERIOR_NASAL_RESTRICTION: 0
OD_SUPERIOR_NASAL_RESTRICTION: 0
OS_INFERIOR_NASAL_RESTRICTION: 0
OD_INFERIOR_TEMPORAL_RESTRICTION: 0
OS_NORMAL: 1
OS_SUPERIOR_NASAL_RESTRICTION: 0
OS_INFERIOR_TEMPORAL_RESTRICTION: 0
METHOD: COUNTING FINGERS
OS_SUPERIOR_TEMPORAL_RESTRICTION: 0
OD_SUPERIOR_TEMPORAL_RESTRICTION: 0

## 2025-06-19 ASSESSMENT — REFRACTION_MANIFEST
OS_ADD: +1.75
OD_AXIS: 117
OD_SPHERE: PLANO
OD_SPHERE: +0.00
OS_CYLINDER: +1.00
OS_AXIS: 075
OD_ADD: +1.75
OS_CYLINDER: +1.00
OD_CYLINDER: SPHERE
METHOD_AUTOREFRACTION: 1
OS_SPHERE: -1.00
OS_SPHERE: -0.75
OD_CYLINDER: +0.25
OS_AXIS: 082

## 2025-06-19 ASSESSMENT — VISUAL ACUITY
OS_CC: 20/25
OD_CC: 20/20
OS_CC+: +2
OS_CC: 20/20 -2
OD_CC+: +2
OD_CC: 20/25
METHOD: SNELLEN - LINEAR
CORRECTION_TYPE: GLASSES

## 2025-06-19 ASSESSMENT — SLIT LAMP EXAM - LIDS
COMMENTS: 2+ MEIBOMIAN GLAND DYSFUNCTION
COMMENTS: 2+ MEIBOMIAN GLAND DYSFUNCTION

## 2025-06-19 ASSESSMENT — KERATOMETRY
OD_K2POWER_DIOPTERS: 44.75
OD_AXISANGLE2_DEGREES: 168
OS_AXISANGLE2_DEGREES: 175
OD_AXISANGLE_DEGREES: 078
OS_K2POWER_DIOPTERS: 45.25
OS_AXISANGLE_DEGREES: 085
OS_K1POWER_DIOPTERS: 44.00
OD_K1POWER_DIOPTERS: 44.25

## 2025-06-19 ASSESSMENT — EXTERNAL EXAM - LEFT EYE: OS_EXAM: NORMAL

## 2025-06-19 ASSESSMENT — TONOMETRY
OS_IOP_MMHG: 14
IOP_METHOD: APPLANATION
OD_IOP_MMHG: 14

## 2025-06-19 ASSESSMENT — REFRACTION_WEARINGRX
SPECS_TYPE: SVL
OD_CYLINDER: SPHERE
OS_CYLINDER: SPHERE
OS_SPHERE: +1.75
OD_SPHERE: +1.75

## 2025-06-19 ASSESSMENT — CUP TO DISC RATIO
OD_RATIO: 0.3
OS_RATIO: 0.3

## 2025-06-19 ASSESSMENT — EXTERNAL EXAM - RIGHT EYE: OD_EXAM: NORMAL

## 2025-06-19 NOTE — LETTER
6/19/2025      Alejandro Flores  [address] [address]      Dear Colleague,    Thank you for referring your patient, Alejandro Flores, to the [facility]. No diabetic retinopathy was found at eye exam. Please see a copy of my visit note below.    Chief Complaint   Patient presents with     Diabetic Eye Exam        Chief Complaint(s) and History of Present Illness(es)       Diabetic Eye Exam              Diabetes Type: Type 2, on insulin and taking oral medications    Duration: years    Blood Sugars: is controlled                   Lab Results   Component Value Date    A1C 6.7 03/03/2025    A1C 8.6 12/02/2024    A1C 7.5 08/12/2024    A1C 7.1 03/26/2024    A1C 8.0 01/05/2024            Last Eye Exam: 0  Dilated Previously: Yes    What are you currently using to see?  readers    Distance Vision Acuity: harder to see at night     Near Vision Acuity: good with readers    Eye Comfort: good  Do you use eye drops? 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       OBJECTIVE: See Ophthalmology exam    ASSESSMENT:    ICD-10-CM    1. Vision exam with abnormal findings  Z01.01       2. Type 2 diabetes mellitus without complication, with long-term current use of insulin (H)  E11.9     Z79.4       3. Presbyopia  H52.4       4. Regular astigmatism of left eye  H52.222           PLAN:    Alejandro Flores aware  eye exam results will be sent to Tyler Barbosa.  Patient Instructions   +1.75 Over the counter reading glasses   Keep blood sugar under good control  Return in 1 year for diabetic eye exam      Blood sugar and blood pressure control are very important in the prevention of ocular complications from diabetes.       Ni Cope, OD  [phone]                     Again, thank you for allowing me to participate in the care of your patient.         Sincerely,        Ni Cope OD    Electronically signed

## 2025-06-19 NOTE — PROGRESS NOTES
Chief Complaint   Patient presents with    Diabetic Eye Exam        Chief Complaint(s) and History of Present Illness(es)       Diabetic Eye Exam              Diabetes Type: Type 2, on insulin and taking oral medications    Duration: years    Blood Sugars: is controlled                   Lab Results   Component Value Date    A1C 6.7 03/03/2025    A1C 8.6 12/02/2024    A1C 7.5 08/12/2024    A1C 7.1 03/26/2024    A1C 8.0 01/05/2024            Last Eye Exam: 0  Dilated Previously: Yes    What are you currently using to see?  readers    Distance Vision Acuity: harder to see at night     Near Vision Acuity: good with readers    Eye Comfort: good  Do you use eye drops? 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       OBJECTIVE: See Ophthalmology exam    ASSESSMENT:    ICD-10-CM    1. Vision exam with abnormal findings  Z01.01       2. Type 2 diabetes mellitus without complication, with long-term current use of insulin (H)  E11.9     Z79.4       3. Presbyopia  H52.4       4. Regular astigmatism of left eye  H52.222           PLAN:    Alejandro Flores aware  eye exam results will be sent to Tyler Barbosa.  Patient Instructions   +1.75 Over the counter reading glasses   Keep blood sugar under good control  Return in 1 year for diabetic eye exam      Blood sugar and blood pressure control are very important in the prevention of ocular complications from diabetes.       Ni Cope, OD  [phone]

## 2025-06-19 NOTE — PATIENT INSTRUCTIONS
+1.75 Over the counter reading glasses   Keep blood sugar under good control  Return in 1 year for diabetic eye exam      Blood sugar and blood pressure control are very important in the prevention of ocular complications from diabetes.       Ni Cope, OD  [phone]

## 2025-06-21 ENCOUNTER — HEALTH MAINTENANCE LETTER (OUTPATIENT)
Age: 60
End: 2025-06-21

## 2025-07-16 DIAGNOSIS — E78.00 PURE HYPERCHOLESTEROLEMIA: ICD-10-CM

## 2025-07-17 RX ORDER — ROSUVASTATIN CALCIUM 10 MG/1
10 TABLET, COATED ORAL DAILY
Qty: 90 TABLET | Refills: 1 | Status: SHIPPED | OUTPATIENT
Start: 2025-07-17

## 2025-08-18 DIAGNOSIS — E11.9 TYPE 2 DIABETES MELLITUS WITHOUT COMPLICATION, WITH LONG-TERM CURRENT USE OF INSULIN (H): ICD-10-CM

## 2025-08-18 DIAGNOSIS — Z79.4 TYPE 2 DIABETES MELLITUS WITHOUT COMPLICATION, WITH LONG-TERM CURRENT USE OF INSULIN (H): ICD-10-CM
